# Patient Record
Sex: MALE | Race: WHITE | NOT HISPANIC OR LATINO | Employment: FULL TIME | URBAN - METROPOLITAN AREA
[De-identification: names, ages, dates, MRNs, and addresses within clinical notes are randomized per-mention and may not be internally consistent; named-entity substitution may affect disease eponyms.]

---

## 2017-05-15 ENCOUNTER — HOSPITAL ENCOUNTER (EMERGENCY)
Facility: HOSPITAL | Age: 47
Discharge: HOME | End: 2017-05-15
Attending: EMERGENCY MEDICINE | Admitting: EMERGENCY MEDICINE
Payer: COMMERCIAL

## 2017-05-15 ENCOUNTER — APPOINTMENT (EMERGENCY)
Dept: RADIOLOGY | Facility: HOSPITAL | Age: 47
End: 2017-05-15
Payer: COMMERCIAL

## 2017-05-15 VITALS
SYSTOLIC BLOOD PRESSURE: 153 MMHG | DIASTOLIC BLOOD PRESSURE: 65 MMHG | OXYGEN SATURATION: 100 % | RESPIRATION RATE: 18 BRPM | BODY MASS INDEX: 34.48 KG/M2 | WEIGHT: 246.25 LBS | HEART RATE: 61 BPM | HEIGHT: 71 IN | TEMPERATURE: 98.5 F

## 2017-05-15 DIAGNOSIS — R07.89 CHEST WALL PAIN: Primary | ICD-10-CM

## 2017-05-15 LAB
ALBUMIN SERPL BCP-MCNC: 3.5 G/DL (ref 3.5–5)
ALP SERPL-CCNC: 82 U/L (ref 46–116)
ALT SERPL W P-5'-P-CCNC: 23 U/L (ref 12–78)
ANION GAP SERPL CALCULATED.3IONS-SCNC: 5 MMOL/L (ref 4–13)
AST SERPL W P-5'-P-CCNC: 22 U/L (ref 5–45)
BASOPHILS # BLD AUTO: 0.08 THOUSANDS/ΜL (ref 0–0.1)
BASOPHILS NFR BLD AUTO: 2 % (ref 0–1)
BILIRUB SERPL-MCNC: 0.6 MG/DL (ref 0.2–1)
BUN SERPL-MCNC: 11 MG/DL (ref 5–25)
CALCIUM SERPL-MCNC: 8.5 MG/DL (ref 8.3–10.1)
CHLORIDE SERPL-SCNC: 107 MMOL/L (ref 100–108)
CO2 SERPL-SCNC: 30 MMOL/L (ref 21–32)
CREAT SERPL-MCNC: 1.31 MG/DL (ref 0.6–1.3)
EOSINOPHIL # BLD AUTO: 0.4 THOUSAND/ΜL (ref 0–0.61)
EOSINOPHIL NFR BLD AUTO: 8 % (ref 0–6)
ERYTHROCYTE [DISTWIDTH] IN BLOOD BY AUTOMATED COUNT: 20.9 % (ref 11.6–15.1)
GFR SERPL CREATININE-BSD FRML MDRD: >60 ML/MIN/1.73SQ M
GLUCOSE SERPL-MCNC: 99 MG/DL (ref 65–140)
HCT VFR BLD AUTO: 31.6 % (ref 36.5–49.3)
HGB BLD-MCNC: 9.1 G/DL (ref 12–17)
LYMPHOCYTES # BLD AUTO: 1.49 THOUSANDS/ΜL (ref 0.6–4.47)
LYMPHOCYTES NFR BLD AUTO: 31 % (ref 14–44)
MCH RBC QN AUTO: 18.6 PG (ref 26.8–34.3)
MCHC RBC AUTO-ENTMCNC: 28.8 G/DL (ref 31.4–37.4)
MCV RBC AUTO: 65 FL (ref 82–98)
MONOCYTES # BLD AUTO: 0.6 THOUSAND/ΜL (ref 0.17–1.22)
MONOCYTES NFR BLD AUTO: 12 % (ref 4–12)
NEUTROPHILS # BLD AUTO: 2.31 THOUSANDS/ΜL (ref 1.85–7.62)
NEUTS SEG NFR BLD AUTO: 47 % (ref 43–75)
NRBC BLD AUTO-RTO: 0 /100 WBCS
PLATELET # BLD AUTO: 381 THOUSANDS/UL (ref 149–390)
PMV BLD AUTO: 9.2 FL (ref 8.9–12.7)
POTASSIUM SERPL-SCNC: 3.7 MMOL/L (ref 3.5–5.3)
PROT SERPL-MCNC: 7.7 G/DL (ref 6.4–8.2)
RBC # BLD AUTO: 4.9 MILLION/UL (ref 3.88–5.62)
SODIUM SERPL-SCNC: 142 MMOL/L (ref 136–145)
TROPONIN I SERPL-MCNC: <0.02 NG/ML
WBC # BLD AUTO: 4.89 THOUSAND/UL (ref 4.31–10.16)

## 2017-05-15 PROCEDURE — 93005 ELECTROCARDIOGRAM TRACING: CPT | Performed by: NURSE PRACTITIONER

## 2017-05-15 PROCEDURE — 71020 HB CHEST X-RAY 2VW FRONTAL&LATL: CPT

## 2017-05-15 PROCEDURE — 84484 ASSAY OF TROPONIN QUANT: CPT | Performed by: NURSE PRACTITIONER

## 2017-05-15 PROCEDURE — 85025 COMPLETE CBC W/AUTO DIFF WBC: CPT | Performed by: NURSE PRACTITIONER

## 2017-05-15 PROCEDURE — 99285 EMERGENCY DEPT VISIT HI MDM: CPT

## 2017-05-15 PROCEDURE — 80053 COMPREHEN METABOLIC PANEL: CPT | Performed by: NURSE PRACTITIONER

## 2017-05-15 PROCEDURE — 36415 COLL VENOUS BLD VENIPUNCTURE: CPT | Performed by: NURSE PRACTITIONER

## 2017-05-15 RX ORDER — LORATADINE 10 MG/1
10 TABLET ORAL DAILY
COMMUNITY
End: 2021-01-18

## 2017-05-15 RX ORDER — LEVOTHYROXINE SODIUM 0.12 MG/1
125 TABLET ORAL DAILY
COMMUNITY
End: 2020-05-13 | Stop reason: ALTCHOICE

## 2017-05-16 LAB
ATRIAL RATE: 71 BPM
P AXIS: 60 DEGREES
PR INTERVAL: 160 MS
QRS AXIS: 14 DEGREES
QRSD INTERVAL: 90 MS
QT INTERVAL: 378 MS
QTC INTERVAL: 410 MS
T WAVE AXIS: 40 DEGREES
VENTRICULAR RATE: 71 BPM

## 2020-05-11 ENCOUNTER — TELEPHONE (OUTPATIENT)
Dept: FAMILY MEDICINE CLINIC | Facility: CLINIC | Age: 50
End: 2020-05-11

## 2020-05-11 RX ORDER — TADALAFIL 10 MG/1
TABLET ORAL
COMMUNITY
Start: 2020-05-09 | End: 2020-07-13 | Stop reason: SDUPTHER

## 2020-05-11 RX ORDER — OXYBUTYNIN CHLORIDE 5 MG/1
5 TABLET ORAL 2 TIMES DAILY
COMMUNITY
Start: 2020-03-23

## 2020-05-11 RX ORDER — TAMSULOSIN HYDROCHLORIDE 0.4 MG/1
0.4 CAPSULE ORAL DAILY
COMMUNITY
Start: 2020-02-06

## 2020-05-11 RX ORDER — LEVOTHYROXINE SODIUM 137 UG/1
137 TABLET ORAL DAILY
COMMUNITY
Start: 2020-05-10 | End: 2020-08-13

## 2020-05-13 ENCOUNTER — TELEMEDICINE (OUTPATIENT)
Dept: FAMILY MEDICINE CLINIC | Facility: CLINIC | Age: 50
End: 2020-05-13
Payer: COMMERCIAL

## 2020-05-13 DIAGNOSIS — N40.1 BENIGN PROSTATIC HYPERPLASIA WITH URINARY FREQUENCY: ICD-10-CM

## 2020-05-13 DIAGNOSIS — J30.2 SEASONAL ALLERGIES: ICD-10-CM

## 2020-05-13 DIAGNOSIS — R73.03 PREDIABETES: ICD-10-CM

## 2020-05-13 DIAGNOSIS — E61.1 IRON DEFICIENCY: ICD-10-CM

## 2020-05-13 DIAGNOSIS — E03.9 HYPOTHYROIDISM, UNSPECIFIED TYPE: Primary | ICD-10-CM

## 2020-05-13 DIAGNOSIS — E55.9 VITAMIN D DEFICIENCY: ICD-10-CM

## 2020-05-13 DIAGNOSIS — R35.0 BENIGN PROSTATIC HYPERPLASIA WITH URINARY FREQUENCY: ICD-10-CM

## 2020-05-13 PROCEDURE — 99203 OFFICE O/P NEW LOW 30 MIN: CPT | Performed by: FAMILY MEDICINE

## 2020-05-13 RX ORDER — MULTIVITAMIN
1 CAPSULE ORAL DAILY
COMMUNITY

## 2020-05-13 RX ORDER — CHOLECALCIFEROL (VITAMIN D3) 125 MCG
CAPSULE ORAL
COMMUNITY

## 2020-05-13 RX ORDER — ASCORBIC ACID 500 MG
500 TABLET ORAL DAILY
COMMUNITY

## 2020-06-26 ENCOUNTER — APPOINTMENT (OUTPATIENT)
Dept: LAB | Facility: CLINIC | Age: 50
End: 2020-06-26
Payer: COMMERCIAL

## 2020-06-26 DIAGNOSIS — E55.9 VITAMIN D DEFICIENCY: ICD-10-CM

## 2020-06-26 DIAGNOSIS — R73.03 PREDIABETES: ICD-10-CM

## 2020-06-26 DIAGNOSIS — E61.1 IRON DEFICIENCY: ICD-10-CM

## 2020-06-26 DIAGNOSIS — E03.9 HYPOTHYROIDISM, UNSPECIFIED TYPE: ICD-10-CM

## 2020-06-26 LAB
25(OH)D3 SERPL-MCNC: 46.8 NG/ML (ref 30–100)
ALBUMIN SERPL BCP-MCNC: 3.8 G/DL (ref 3.5–5)
ALP SERPL-CCNC: 73 U/L (ref 46–116)
ALT SERPL W P-5'-P-CCNC: 29 U/L (ref 12–78)
ANION GAP SERPL CALCULATED.3IONS-SCNC: 4 MMOL/L (ref 4–13)
AST SERPL W P-5'-P-CCNC: 22 U/L (ref 5–45)
BASOPHILS # BLD AUTO: 0.07 THOUSANDS/ΜL (ref 0–0.1)
BASOPHILS NFR BLD AUTO: 2 % (ref 0–1)
BILIRUB SERPL-MCNC: 0.93 MG/DL (ref 0.2–1)
BUN SERPL-MCNC: 14 MG/DL (ref 5–25)
CALCIUM SERPL-MCNC: 9.2 MG/DL (ref 8.3–10.1)
CHLORIDE SERPL-SCNC: 105 MMOL/L (ref 100–108)
CHOLEST SERPL-MCNC: 194 MG/DL (ref 50–200)
CO2 SERPL-SCNC: 31 MMOL/L (ref 21–32)
CREAT SERPL-MCNC: 1.28 MG/DL (ref 0.6–1.3)
EOSINOPHIL # BLD AUTO: 0.17 THOUSAND/ΜL (ref 0–0.61)
EOSINOPHIL NFR BLD AUTO: 4 % (ref 0–6)
ERYTHROCYTE [DISTWIDTH] IN BLOOD BY AUTOMATED COUNT: 12.9 % (ref 11.6–15.1)
EST. AVERAGE GLUCOSE BLD GHB EST-MCNC: 123 MG/DL
FERRITIN SERPL-MCNC: 72 NG/ML (ref 8–388)
GFR SERPL CREATININE-BSD FRML MDRD: 65 ML/MIN/1.73SQ M
GLUCOSE P FAST SERPL-MCNC: 93 MG/DL (ref 65–99)
HBA1C MFR BLD: 5.9 %
HCT VFR BLD AUTO: 44.6 % (ref 36.5–49.3)
HDLC SERPL-MCNC: 45 MG/DL
HGB BLD-MCNC: 14.9 G/DL (ref 12–17)
IMM GRANULOCYTES # BLD AUTO: 0.01 THOUSAND/UL (ref 0–0.2)
IMM GRANULOCYTES NFR BLD AUTO: 0 % (ref 0–2)
IRON SATN MFR SERPL: 26 %
IRON SERPL-MCNC: 81 UG/DL (ref 65–175)
LDLC SERPL CALC-MCNC: 141 MG/DL (ref 0–100)
LYMPHOCYTES # BLD AUTO: 1.38 THOUSANDS/ΜL (ref 0.6–4.47)
LYMPHOCYTES NFR BLD AUTO: 36 % (ref 14–44)
MCH RBC QN AUTO: 29.3 PG (ref 26.8–34.3)
MCHC RBC AUTO-ENTMCNC: 33.4 G/DL (ref 31.4–37.4)
MCV RBC AUTO: 88 FL (ref 82–98)
MONOCYTES # BLD AUTO: 0.37 THOUSAND/ΜL (ref 0.17–1.22)
MONOCYTES NFR BLD AUTO: 10 % (ref 4–12)
NEUTROPHILS # BLD AUTO: 1.85 THOUSANDS/ΜL (ref 1.85–7.62)
NEUTS SEG NFR BLD AUTO: 48 % (ref 43–75)
NONHDLC SERPL-MCNC: 149 MG/DL
NRBC BLD AUTO-RTO: 0 /100 WBCS
PLATELET # BLD AUTO: 224 THOUSANDS/UL (ref 149–390)
PMV BLD AUTO: 10.4 FL (ref 8.9–12.7)
POTASSIUM SERPL-SCNC: 4 MMOL/L (ref 3.5–5.3)
PROT SERPL-MCNC: 7.7 G/DL (ref 6.4–8.2)
RBC # BLD AUTO: 5.08 MILLION/UL (ref 3.88–5.62)
SODIUM SERPL-SCNC: 140 MMOL/L (ref 136–145)
TIBC SERPL-MCNC: 309 UG/DL (ref 250–450)
TRIGL SERPL-MCNC: 41 MG/DL
TSH SERPL DL<=0.05 MIU/L-ACNC: 2.85 UIU/ML (ref 0.36–3.74)
VIT B12 SERPL-MCNC: 533 PG/ML (ref 100–900)
WBC # BLD AUTO: 3.85 THOUSAND/UL (ref 4.31–10.16)

## 2020-06-26 PROCEDURE — 80053 COMPREHEN METABOLIC PANEL: CPT

## 2020-06-26 PROCEDURE — 83550 IRON BINDING TEST: CPT

## 2020-06-26 PROCEDURE — 84443 ASSAY THYROID STIM HORMONE: CPT

## 2020-06-26 PROCEDURE — 82306 VITAMIN D 25 HYDROXY: CPT

## 2020-06-26 PROCEDURE — 80061 LIPID PANEL: CPT

## 2020-06-26 PROCEDURE — 36415 COLL VENOUS BLD VENIPUNCTURE: CPT

## 2020-06-26 PROCEDURE — 85025 COMPLETE CBC W/AUTO DIFF WBC: CPT

## 2020-06-26 PROCEDURE — 82607 VITAMIN B-12: CPT

## 2020-06-26 PROCEDURE — 83036 HEMOGLOBIN GLYCOSYLATED A1C: CPT

## 2020-06-26 PROCEDURE — 83540 ASSAY OF IRON: CPT

## 2020-06-26 PROCEDURE — 82728 ASSAY OF FERRITIN: CPT

## 2020-07-13 DIAGNOSIS — N52.9 ERECTILE DYSFUNCTION, UNSPECIFIED ERECTILE DYSFUNCTION TYPE: Primary | ICD-10-CM

## 2020-07-14 DIAGNOSIS — K21.9 GASTROESOPHAGEAL REFLUX DISEASE WITHOUT ESOPHAGITIS: Primary | ICD-10-CM

## 2020-07-14 RX ORDER — PANTOPRAZOLE SODIUM 40 MG/1
40 TABLET, DELAYED RELEASE ORAL DAILY
COMMUNITY
End: 2020-07-14 | Stop reason: SDUPTHER

## 2020-07-14 RX ORDER — PANTOPRAZOLE SODIUM 40 MG/1
40 TABLET, DELAYED RELEASE ORAL DAILY
Qty: 90 TABLET | Refills: 0 | Status: SHIPPED | OUTPATIENT
Start: 2020-07-14

## 2020-07-14 RX ORDER — TADALAFIL 10 MG/1
10 TABLET ORAL DAILY PRN
Qty: 90 TABLET | Refills: 1 | Status: SHIPPED | OUTPATIENT
Start: 2020-07-14

## 2020-08-13 DIAGNOSIS — E03.9 HYPOTHYROIDISM, UNSPECIFIED TYPE: Primary | ICD-10-CM

## 2020-08-13 RX ORDER — LEVOTHYROXINE SODIUM 137 UG/1
TABLET ORAL
Qty: 90 TABLET | Refills: 1 | Status: SHIPPED | OUTPATIENT
Start: 2020-08-13 | End: 2022-07-18 | Stop reason: SDUPTHER

## 2020-12-27 ENCOUNTER — OFFICE VISIT (OUTPATIENT)
Dept: URGENT CARE | Facility: CLINIC | Age: 50
End: 2020-12-27
Payer: COMMERCIAL

## 2020-12-27 VITALS
TEMPERATURE: 97.3 F | HEIGHT: 71 IN | RESPIRATION RATE: 16 BRPM | HEART RATE: 69 BPM | BODY MASS INDEX: 33.32 KG/M2 | SYSTOLIC BLOOD PRESSURE: 135 MMHG | WEIGHT: 238 LBS | DIASTOLIC BLOOD PRESSURE: 66 MMHG | OXYGEN SATURATION: 99 %

## 2020-12-27 DIAGNOSIS — J01.10 ACUTE NON-RECURRENT FRONTAL SINUSITIS: Primary | ICD-10-CM

## 2020-12-27 PROCEDURE — 99203 OFFICE O/P NEW LOW 30 MIN: CPT | Performed by: NURSE PRACTITIONER

## 2020-12-27 RX ORDER — AMOXICILLIN AND CLAVULANATE POTASSIUM 875; 125 MG/1; MG/1
1 TABLET, FILM COATED ORAL EVERY 12 HOURS SCHEDULED
Qty: 14 TABLET | Refills: 0 | Status: SHIPPED | OUTPATIENT
Start: 2020-12-27 | End: 2021-01-03

## 2022-07-18 ENCOUNTER — TELEPHONE (OUTPATIENT)
Dept: FAMILY MEDICINE CLINIC | Facility: CLINIC | Age: 52
End: 2022-07-18

## 2022-07-18 ENCOUNTER — OFFICE VISIT (OUTPATIENT)
Dept: FAMILY MEDICINE CLINIC | Facility: CLINIC | Age: 52
End: 2022-07-18
Payer: COMMERCIAL

## 2022-07-18 VITALS
WEIGHT: 240 LBS | HEIGHT: 71 IN | HEART RATE: 88 BPM | OXYGEN SATURATION: 98 % | BODY MASS INDEX: 33.6 KG/M2 | DIASTOLIC BLOOD PRESSURE: 82 MMHG | SYSTOLIC BLOOD PRESSURE: 130 MMHG

## 2022-07-18 DIAGNOSIS — R00.2 PALPITATIONS: Primary | ICD-10-CM

## 2022-07-18 DIAGNOSIS — R00.1 BRADYCARDIA: ICD-10-CM

## 2022-07-18 DIAGNOSIS — R00.0 TACHYCARDIA: ICD-10-CM

## 2022-07-18 DIAGNOSIS — R00.9 HEART RATE PROBLEM: ICD-10-CM

## 2022-07-18 DIAGNOSIS — I10 ESSENTIAL HYPERTENSION: ICD-10-CM

## 2022-07-18 DIAGNOSIS — E03.9 ACQUIRED HYPOTHYROIDISM: ICD-10-CM

## 2022-07-18 DIAGNOSIS — Z00.00 ENCOUNTER FOR PREVENTIVE CARE: ICD-10-CM

## 2022-07-18 DIAGNOSIS — E03.9 HYPOTHYROIDISM, UNSPECIFIED TYPE: ICD-10-CM

## 2022-07-18 DIAGNOSIS — Z86.2 HISTORY OF IRON DEFICIENCY ANEMIA: ICD-10-CM

## 2022-07-18 DIAGNOSIS — Z11.59 NEED FOR HEPATITIS C SCREENING TEST: ICD-10-CM

## 2022-07-18 DIAGNOSIS — Z13.1 SCREENING FOR DIABETES MELLITUS: ICD-10-CM

## 2022-07-18 DIAGNOSIS — R35.1 NOCTURIA: ICD-10-CM

## 2022-07-18 DIAGNOSIS — K21.9 GASTROESOPHAGEAL REFLUX DISEASE, UNSPECIFIED WHETHER ESOPHAGITIS PRESENT: ICD-10-CM

## 2022-07-18 PROBLEM — R93.1 ABNORMAL ECHOCARDIOGRAM: Status: ACTIVE | Noted: 2021-12-14

## 2022-07-18 PROBLEM — N40.1 BENIGN PROSTATIC HYPERPLASIA WITH URINARY FREQUENCY: Status: RESOLVED | Noted: 2020-05-13 | Resolved: 2022-07-18

## 2022-07-18 PROBLEM — R35.0 BENIGN PROSTATIC HYPERPLASIA WITH URINARY FREQUENCY: Status: RESOLVED | Noted: 2020-05-13 | Resolved: 2022-07-18

## 2022-07-18 PROBLEM — J45.909 ASTHMA: Status: ACTIVE | Noted: 2021-12-14

## 2022-07-18 PROBLEM — E78.2 MIXED HYPERLIPIDEMIA: Status: ACTIVE | Noted: 2021-12-14

## 2022-07-18 PROBLEM — N40.1 BENIGN PROSTATIC HYPERPLASIA WITH NOCTURIA: Status: ACTIVE | Noted: 2021-12-14

## 2022-07-18 PROCEDURE — 99214 OFFICE O/P EST MOD 30 MIN: CPT | Performed by: FAMILY MEDICINE

## 2022-07-18 PROCEDURE — 3079F DIAST BP 80-89 MM HG: CPT | Performed by: FAMILY MEDICINE

## 2022-07-18 PROCEDURE — 3725F SCREEN DEPRESSION PERFORMED: CPT | Performed by: FAMILY MEDICINE

## 2022-07-18 PROCEDURE — 99396 PREV VISIT EST AGE 40-64: CPT | Performed by: FAMILY MEDICINE

## 2022-07-18 RX ORDER — AMOXICILLIN AND CLAVULANATE POTASSIUM 875; 125 MG/1; MG/1
1 TABLET, FILM COATED ORAL EVERY 12 HOURS
COMMUNITY
Start: 2022-04-21 | End: 2022-07-18

## 2022-07-18 RX ORDER — METHYLPREDNISOLONE 4 MG/1
TABLET ORAL
COMMUNITY
Start: 2022-04-21 | End: 2022-07-18

## 2022-07-18 RX ORDER — LEVOTHYROXINE SODIUM 137 UG/1
137 TABLET ORAL DAILY
Qty: 90 TABLET | Refills: 0 | Status: SHIPPED | OUTPATIENT
Start: 2022-07-18 | End: 2022-07-20

## 2022-07-18 RX ORDER — HYDROCHLOROTHIAZIDE 12.5 MG/1
12.5 TABLET ORAL DAILY
COMMUNITY
Start: 2022-01-20 | End: 2022-07-27 | Stop reason: SDUPTHER

## 2022-07-18 RX ORDER — AZITHROMYCIN 250 MG/1
TABLET, FILM COATED ORAL
COMMUNITY
Start: 2022-04-18 | End: 2022-07-18

## 2022-07-18 NOTE — TELEPHONE ENCOUNTER
Patient wanted to make sure a refill of his Levothyroxine was put into [facility] on [facility]  He said Dr Octavio Page had mentioned doing it at today's appt

## 2022-07-18 NOTE — PATIENT INSTRUCTIONS
Please obtain the labs that I have ordered for you today  Attempt to get them fasting, no food or drink except for water for 8-12 hours before  Please continue to monitor her blood pressure at home, make sure that you are seated, no caffeine, and that you rest for at least 2-3 minutes  If you have a wrist cuff please make sure it goes over the chest if it is a arm cuff that make sure your seated and in a resting position  I have ordered for you a Holter monitor and echocardiogram   The cardiologist may end up cancelling these tests, but they will start the process of looking into the fast and slow heart rate and also the function of the heart as well  If you can try to get the results of your stress test that would be greatly beneficial to the cardiologist prior to seeing them  Please watch your caloric intake and attempt to decrease your fatty your foods as well as the foods with no nutritional value  Replace with lean meats and foods that will fill you up

## 2022-07-18 NOTE — PROGRESS NOTES
New Patient Outpatient Note    HPI:     Stephen, 47 y o  male  presents today as a new patient to establish care  The patient is concerned for he recently has been having increased frequency of heart related episodes  He will have pulses that are high and on the low end  The low and has been around 48, the high end can be over 100  Typically the higher values are when he is exercising or walking  The lows occurred when he was laying down or resting, he is not sleeping  He is concerned for he is symptomatic during these times with abnormal feeling all over the body- jitteriness and feeling of uneasiness  He denies any chest pain, shortness of breath, diaphoresis  He does have an appointment with cardiology coming up on 07/27/2022  He does have a significant history of iron deficiency anemia, he was previously on iron but has not taken it in over a year        Family Hx  UTD in chart    Past Medical History:   Diagnosis Date    Allergic rhinitis     Anemia     Asthma 12/14/2021    Disease of thyroid gland     Hypothyroidism     Nasal congestion         Past Surgical History:   Procedure Laterality Date    NO PAST SURGERIES            Current Outpatient Medications:     ascorbic acid (VITAMIN C) 500 mg tablet, Take 500 mg by mouth daily, Disp: , Rfl:     cetirizine (ZyrTEC) 10 mg tablet, Take 1 tablet (10 mg total) by mouth daily at bedtime, Disp: 30 tablet, Rfl: 5    Cholecalciferol (VITAMIN D) 125 MCG (5000 UT) CAPS, Take by mouth, Disp: , Rfl:     Ferrous Sulfate (IRON PO), Take by mouth, Disp: , Rfl:     fluticasone (FLONASE) 50 mcg/act nasal spray, 2 sprays into each nostril 2 (two) times a day, Disp: 1 Bottle, Rfl: 5    hydrochlorothiazide (HYDRODIURIL) 12 5 mg tablet, Take 12 5 mg by mouth daily, Disp: , Rfl:     levothyroxine 137 mcg tablet, Take 1 tablet (137 mcg total) by mouth daily, Disp: 90 tablet, Rfl: 0    Multiple Vitamin (MULTIVITAMIN) capsule, Take 1 capsule by mouth daily, Disp: , Rfl:     oxybutynin (DITROPAN) 5 mg tablet, Take 5 mg by mouth 2 (two) times a day, Disp: , Rfl:     pantoprazole (PROTONIX) 40 mg tablet, Take 1 tablet (40 mg total) by mouth daily, Disp: 90 tablet, Rfl: 0    tadalafil (CIALIS) 10 MG tablet, Take 1 tablet (10 mg total) by mouth daily as needed for erectile dysfunction, Disp: 90 tablet, Rfl: 1    tamsulosin (FLOMAX) 0 4 mg, Take 0 4 mg by mouth daily, Disp: , Rfl:     VENTOLIN  (90 Base) MCG/ACT inhaler, TAKE 2 PUFFS BY MOUTH EVERY 4 TO 6 HOURS AS NEEDED FOR BREATHING, Disp: , Rfl:      SOCIAL:   Rent/Own:  own  Currently living with: Wife, 2 daughters  Stable food: Yes  Safe At Home: Yes  Hobbies:  Work out, outdoors  Profession/ employment: [employer]  Restriction to medical procedures:  none    SEXUAL HISTORY:   Preference:  Women  Sexually Active:  Yes  Birth Control:  None, post menopausal     Psychological History  Psychiatric history: none  History of inpatient:  none  Current Therapy/ Provider:  None  Current Medications:  None    Substance History  Smoking: none  Alcohol Use: 1 drink per week  Substance use:  none      ROS:   Review of Systems   Constitutional: Negative for chills, fever and unexpected weight change  HENT: Positive for postnasal drip  Negative for congestion, ear discharge, ear pain, hearing loss, rhinorrhea, sinus pressure, sinus pain and sore throat  Respiratory: Negative for cough, chest tightness and shortness of breath  Cardiovascular: Positive for palpitations  Negative for chest pain  Gastrointestinal: Negative for abdominal pain, blood in stool, constipation, diarrhea, nausea and vomiting  Endocrine: Negative for polydipsia, polyphagia and polyuria  Genitourinary: Positive for frequency  Negative for dysuria  Skin: Negative for rash and wound  Allergic/Immunologic: Positive for environmental allergies  Neurological: Negative for dizziness, light-headedness and headaches  Psychiatric/Behavioral: Negative for self-injury and suicidal ideas  OBJECTIVE  Vitals:    07/18/22 1338   BP: 130/82   Pulse: 88   SpO2: 98%        Physical Exam  Constitutional:       General: He is not in acute distress  Appearance: Normal appearance  He is obese  He is not ill-appearing, toxic-appearing or diaphoretic  HENT:      Head: Normocephalic and atraumatic  Right Ear: Tympanic membrane, ear canal and external ear normal  There is no impacted cerumen  Left Ear: Tympanic membrane, ear canal and external ear normal  There is no impacted cerumen  Nose: Nose normal  No congestion or rhinorrhea  Mouth/Throat:      Mouth: Mucous membranes are moist       Pharynx: Oropharynx is clear  No oropharyngeal exudate or posterior oropharyngeal erythema  Eyes:      General:         Right eye: No discharge  Left eye: No discharge  Extraocular Movements: Extraocular movements intact  Conjunctiva/sclera: Conjunctivae normal       Pupils: Pupils are equal, round, and reactive to light  Cardiovascular:      Rate and Rhythm: Normal rate and regular rhythm  Pulses: Normal pulses  Heart sounds: Normal heart sounds  No murmur heard  No friction rub  No gallop  Pulmonary:      Effort: Pulmonary effort is normal  No respiratory distress  Breath sounds: Normal breath sounds  No stridor  No wheezing, rhonchi or rales  Abdominal:      General: Bowel sounds are normal  There is no distension  Palpations: Abdomen is soft  Tenderness: There is no abdominal tenderness  Musculoskeletal:         General: Normal range of motion  Cervical back: Neck supple  No tenderness  Lymphadenopathy:      Cervical: No cervical adenopathy  Skin:     General: Skin is warm  Capillary Refill: Capillary refill takes less than 2 seconds  Neurological:      Mental Status: He is alert        Sensory: No sensory deficit ( light touch present in all 4 extremities)  Motor: No weakness (5/5 in all 4 extremities)  Deep Tendon Reflexes: Reflexes normal ( 2/4, intact, C5, C6, L4, S1)  BMI Counseling: Body mass index is 33 47 kg/m²  The BMI is above normal  Nutrition recommendations include decreasing portion sizes, decreasing fast food intake, consuming healthier snacks and increasing intake of lean protein  Exercise recommendations include moderate physical activity 150 minutes/week and exercising 3-5 times per week  Rationale for BMI follow-up plan is due to patient being overweight or obese  Depression Screening and Follow-up Plan: Patient was screened for depression during today's encounter  They screened negative with a PHQ-2 score of 0         ASSESSMENT AND PLAN   Philip Hickey was seen today for medication refill  Diagnoses and all orders for this visit:    Encounter for preventive care  Palpitations  Heart rate problem  Tachycardia  Bradycardia  History of iron deficiency anemia  Screening for diabetes mellitus  Need for hepatitis C screening test  Patient presents with primary concern of abnormal pulses and associated symptoms  Pt  Has appointment for cardiology  Will order labs, echo, and holter monitor  Labs are to look for underlying electrolyte, magnesium, kidney/liver function, or risk factors such as anemia or high cholesterol  TSH to evaluate thyroid and possible abnormal levels, pt  Has hypothyroidism currently on synthroid  CMP to look for underlying diabetes  He has already had a stress test in 2019 that I recommended results be sent to [facility] or brought to appointment  This will be baseline work up prior to follow up with cards, appreciate recommendations  No EKG performed today due to normal rhythm and rate  -     CBC and differential; Future  -     Iron Panel (Includes Ferritin, Iron Sat%, Iron, and TIBC); Future  -     Holter monitor; Future  -     Echo complete w/ contrast if indicated;  Future  -     Magnesium; Future  -     TSH, 3rd generation with Free T4 reflex; Future  -     Comprehensive metabolic panel; Future  -     Lipid panel; Future        -     Hepatitis C antibody; Future    Acquired hypothyroidism  Patient has history of hypothyroidism  Currently on 137mcg  Will obtain follow up labs to review medication management    -     TSH, 3rd generation with Free T4 reflex; Future    Essential Hypertension  Patient currently being treated for elevated Blood pressure  Current regimen includes hydrochlorothiazide 12,5mg daily  Moderately controlled  Patient to take Bps at home to determine whether medication change is necessary or increase in dose  Diuretic is not the first choice due to rapid changes in pulse  May want to consider ACE/ARB or metoprolol  Mild persistent asthma without complication  Stable  Worse in seasonal allergy months  Has albuterol as needed  Patient to call with required refills of medications  GERD  Stable  Continue on prontonix 20mg daily  Avoid trigger foods        Christiana Low DO  [facility]  7/18/2022 6:23 PM

## 2022-07-19 ENCOUNTER — APPOINTMENT (OUTPATIENT)
Dept: LAB | Facility: CLINIC | Age: 52
End: 2022-07-19
Payer: COMMERCIAL

## 2022-07-19 DIAGNOSIS — R00.0 TACHYCARDIA: ICD-10-CM

## 2022-07-19 DIAGNOSIS — Z13.1 SCREENING FOR DIABETES MELLITUS: ICD-10-CM

## 2022-07-19 DIAGNOSIS — Z86.2 HISTORY OF IRON DEFICIENCY ANEMIA: ICD-10-CM

## 2022-07-19 DIAGNOSIS — R00.2 PALPITATIONS: ICD-10-CM

## 2022-07-19 DIAGNOSIS — E03.9 ACQUIRED HYPOTHYROIDISM: ICD-10-CM

## 2022-07-19 DIAGNOSIS — Z00.00 ENCOUNTER FOR PREVENTIVE CARE: ICD-10-CM

## 2022-07-19 DIAGNOSIS — R00.1 BRADYCARDIA: ICD-10-CM

## 2022-07-19 DIAGNOSIS — Z11.59 NEED FOR HEPATITIS C SCREENING TEST: ICD-10-CM

## 2022-07-19 DIAGNOSIS — R00.9 HEART RATE PROBLEM: ICD-10-CM

## 2022-07-19 LAB
ALBUMIN SERPL BCP-MCNC: 4 G/DL (ref 3.5–5)
ALP SERPL-CCNC: 66 U/L (ref 46–116)
ALT SERPL W P-5'-P-CCNC: 29 U/L (ref 12–78)
ANION GAP SERPL CALCULATED.3IONS-SCNC: 6 MMOL/L (ref 4–13)
AST SERPL W P-5'-P-CCNC: 29 U/L (ref 5–45)
BASOPHILS # BLD AUTO: 0.07 THOUSANDS/ΜL (ref 0–0.1)
BASOPHILS NFR BLD AUTO: 2 % (ref 0–1)
BILIRUB SERPL-MCNC: 1.54 MG/DL (ref 0.2–1)
BUN SERPL-MCNC: 9 MG/DL (ref 5–25)
CALCIUM SERPL-MCNC: 9.6 MG/DL (ref 8.3–10.1)
CHLORIDE SERPL-SCNC: 101 MMOL/L (ref 96–108)
CHOLEST SERPL-MCNC: 207 MG/DL
CO2 SERPL-SCNC: 32 MMOL/L (ref 21–32)
CREAT SERPL-MCNC: 1.4 MG/DL (ref 0.6–1.3)
EOSINOPHIL # BLD AUTO: 0.14 THOUSAND/ΜL (ref 0–0.61)
EOSINOPHIL NFR BLD AUTO: 3 % (ref 0–6)
ERYTHROCYTE [DISTWIDTH] IN BLOOD BY AUTOMATED COUNT: 13.2 % (ref 11.6–15.1)
FERRITIN SERPL-MCNC: 128 NG/ML (ref 8–388)
GFR SERPL CREATININE-BSD FRML MDRD: 57 ML/MIN/1.73SQ M
GLUCOSE P FAST SERPL-MCNC: 104 MG/DL (ref 65–99)
HCT VFR BLD AUTO: 47 % (ref 36.5–49.3)
HCV AB SER QL: NORMAL
HDLC SERPL-MCNC: 44 MG/DL
HGB BLD-MCNC: 15.6 G/DL (ref 12–17)
IMM GRANULOCYTES # BLD AUTO: 0.01 THOUSAND/UL (ref 0–0.2)
IMM GRANULOCYTES NFR BLD AUTO: 0 % (ref 0–2)
IRON SATN MFR SERPL: 22 % (ref 20–50)
IRON SERPL-MCNC: 75 UG/DL (ref 65–175)
LDLC SERPL CALC-MCNC: 147 MG/DL (ref 0–100)
LYMPHOCYTES # BLD AUTO: 1.52 THOUSANDS/ΜL (ref 0.6–4.47)
LYMPHOCYTES NFR BLD AUTO: 34 % (ref 14–44)
MAGNESIUM SERPL-MCNC: 1.9 MG/DL (ref 1.6–2.6)
MCH RBC QN AUTO: 29.1 PG (ref 26.8–34.3)
MCHC RBC AUTO-ENTMCNC: 33.2 G/DL (ref 31.4–37.4)
MCV RBC AUTO: 88 FL (ref 82–98)
MONOCYTES # BLD AUTO: 0.47 THOUSAND/ΜL (ref 0.17–1.22)
MONOCYTES NFR BLD AUTO: 11 % (ref 4–12)
NEUTROPHILS # BLD AUTO: 2.28 THOUSANDS/ΜL (ref 1.85–7.62)
NEUTS SEG NFR BLD AUTO: 50 % (ref 43–75)
NONHDLC SERPL-MCNC: 163 MG/DL
NRBC BLD AUTO-RTO: 0 /100 WBCS
PLATELET # BLD AUTO: 243 THOUSANDS/UL (ref 149–390)
PMV BLD AUTO: 10.2 FL (ref 8.9–12.7)
POTASSIUM SERPL-SCNC: 3.7 MMOL/L (ref 3.5–5.3)
PROT SERPL-MCNC: 8.5 G/DL (ref 6.4–8.4)
RBC # BLD AUTO: 5.37 MILLION/UL (ref 3.88–5.62)
SODIUM SERPL-SCNC: 139 MMOL/L (ref 135–147)
T4 FREE SERPL-MCNC: 1.1 NG/DL (ref 0.76–1.46)
TIBC SERPL-MCNC: 335 UG/DL (ref 250–450)
TRIGL SERPL-MCNC: 80 MG/DL
TSH SERPL DL<=0.05 MIU/L-ACNC: 6.04 UIU/ML (ref 0.45–4.5)
WBC # BLD AUTO: 4.49 THOUSAND/UL (ref 4.31–10.16)

## 2022-07-19 PROCEDURE — 84439 ASSAY OF FREE THYROXINE: CPT

## 2022-07-19 PROCEDURE — 86803 HEPATITIS C AB TEST: CPT

## 2022-07-19 PROCEDURE — 36415 COLL VENOUS BLD VENIPUNCTURE: CPT

## 2022-07-19 PROCEDURE — 80053 COMPREHEN METABOLIC PANEL: CPT

## 2022-07-19 PROCEDURE — 83550 IRON BINDING TEST: CPT

## 2022-07-19 PROCEDURE — 83735 ASSAY OF MAGNESIUM: CPT

## 2022-07-19 PROCEDURE — 84443 ASSAY THYROID STIM HORMONE: CPT

## 2022-07-19 PROCEDURE — 80061 LIPID PANEL: CPT

## 2022-07-19 PROCEDURE — 83540 ASSAY OF IRON: CPT

## 2022-07-19 PROCEDURE — 85025 COMPLETE CBC W/AUTO DIFF WBC: CPT

## 2022-07-19 PROCEDURE — 82728 ASSAY OF FERRITIN: CPT

## 2022-07-20 ENCOUNTER — TELEPHONE (OUTPATIENT)
Dept: FAMILY MEDICINE CLINIC | Facility: CLINIC | Age: 52
End: 2022-07-20

## 2022-07-20 DIAGNOSIS — N18.31 STAGE 3A CHRONIC KIDNEY DISEASE (HCC): ICD-10-CM

## 2022-07-20 DIAGNOSIS — R17 ELEVATED BILIRUBIN: ICD-10-CM

## 2022-07-20 DIAGNOSIS — E03.9 HYPOTHYROIDISM, UNSPECIFIED TYPE: Primary | ICD-10-CM

## 2022-07-20 DIAGNOSIS — R79.89 ELEVATED SERUM CREATININE: ICD-10-CM

## 2022-07-20 DIAGNOSIS — E78.2 MIXED HYPERLIPIDEMIA: ICD-10-CM

## 2022-07-20 RX ORDER — LEVOTHYROXINE SODIUM 0.15 MG/1
150 TABLET ORAL DAILY
Qty: 30 TABLET | Refills: 1 | Status: SHIPPED | OUTPATIENT
Start: 2022-07-20 | End: 2022-08-15

## 2022-07-20 NOTE — TELEPHONE ENCOUNTER
Reviewed all labs  Discussed abnormalities and next steps  Due to mild increase in TSH increased dose of levothyroxine to 150mcg  Follow up labs discussed and given reasoning for each       Liborio Ruiz DO  [facility]  7/20/2022 2:32 PM

## 2022-07-27 ENCOUNTER — OFFICE VISIT (OUTPATIENT)
Dept: CARDIOLOGY CLINIC | Facility: CLINIC | Age: 52
End: 2022-07-27
Payer: COMMERCIAL

## 2022-07-27 VITALS
SYSTOLIC BLOOD PRESSURE: 120 MMHG | HEIGHT: 71 IN | BODY MASS INDEX: 32.76 KG/M2 | TEMPERATURE: 98.4 F | HEART RATE: 62 BPM | OXYGEN SATURATION: 96 % | DIASTOLIC BLOOD PRESSURE: 82 MMHG | WEIGHT: 234 LBS

## 2022-07-27 DIAGNOSIS — I10 ESSENTIAL HYPERTENSION: Primary | ICD-10-CM

## 2022-07-27 DIAGNOSIS — R93.1 ABNORMAL ECHOCARDIOGRAM: ICD-10-CM

## 2022-07-27 DIAGNOSIS — E78.2 MIXED HYPERLIPIDEMIA: ICD-10-CM

## 2022-07-27 PROCEDURE — 99203 OFFICE O/P NEW LOW 30 MIN: CPT | Performed by: INTERNAL MEDICINE

## 2022-07-27 PROCEDURE — 93000 ELECTROCARDIOGRAM COMPLETE: CPT | Performed by: INTERNAL MEDICINE

## 2022-07-27 NOTE — PROGRESS NOTES
Consultation - Cardiology Office  [facility]  Igor Ayon 47 y o  male MRN: [id number]  : 1970  Unit/Bed#:  Encounter: [id number]      ASSESSMENT:  Fluctuations in heart rate  Often related to anxiety/panic attack and sometimes postexercise     Heart rate today 62 at rest    History of iron deficiency anemia    Hypertension  BP today is 120/82 mmHg  On Hctz    Hypothyroidism    Obesity, BMI 32 64    Anxiety and Panic Disorders    Nuclear stress test, 2017  Impression:     Normal  exercise nuclear study at an adequate workload >85% of the   predicted maximum heart rate  The images reveal no   evidence for myocardial ischemia or myocardial infarction   The LVEF is  50 %     Lower extremity venous duplex, 2021  RIGHT:  No evidence of DVT or SVT in the right lower extremity                Right anterior mid calf skin thickening with mild   subcutaneous edema noted at patient's area of concern                No evidence of SVT or fluid collection at patient's area of   concern       RECOMMENDATIONS:  Echocardiogram is ordered see 2022  Holter monitor ordered 2022        Thank you for your consultation  If you have any question please call me at [phone]      Primary Care Physician Requesting Consult: Thuan Wagoner DO      Reason for Consult / Principal Problem:  Fluctuations in heart rate and to establish cardiac care        HPI :     Igor Ayon is a 47y o  year old male who was referred by primary care doctor for establishing cardiac care  Patient has a history of hypertension which is well controlled with hydrochlorothiazide  He has also been experiencing fluctuations in his heart rate which goes down when he sleeping or resting and increases with after exercise    Once he saw his heart rate shoot up into the 130s and higher after a possible panic attack  His PCP has already ordered an echocardiogram and a Holter monitor      Review of Systems  REVIEW OF SYSTEMS:      Constitutional: Negative for activity change, appetite change, chills, fatigue, fever and unexpected weight change  HENT: Negative for congestion, sore throat and trouble swallowing  Eyes: Negative for discharge and redness  Respiratory: Negative for apnea, cough, chest tightness, shortness of breath and wheezing  Cardiovascular: Negative for chest pain, shortness of breath, palpitations and leg swelling  Positive for fluctuations in heart rate Gastrointestinal: Negative for abdominal distention, abdominal pain, anal bleeding, blood in stool, constipation, diarrhea, nausea and vomiting  Endocrine: Negative for polydipsia, polyphagia and polyuria  Genitourinary: Negative for difficulty urinating, dysuria, flank pain and hematuria  Musculoskeletal: Negative for arthralgias, myalgias and neck stiffness  Skin: Negative for pallor and rash  Allergic/Immunologic: Negative for environmental allergies  Neurological: Negative for dizziness, syncope, light-headedness, numbness and headaches  Hematological: Negative for adenopathy  Does not bruise/bleed easily  Psychiatric/Behavioral: Negative for confusion and hallucinations   The patient is somewhat anxious and gives a history of panic attack     Historical Information   Past Medical History:   Diagnosis Date    Allergic rhinitis     Anemia     Asthma 12/14/2021    Disease of thyroid gland     Hypothyroidism     Nasal congestion      Past Surgical History:   Procedure Laterality Date    NO PAST SURGERIES       Social History     Substance and Sexual Activity   Alcohol Use Yes    Comment: 1 drink per month     Social History     Substance and Sexual Activity   Drug Use No     Social History     Tobacco Use   Smoking Status Never Smoker   Smokeless Tobacco Never Used     Family History:   Family History   Problem Relation Age of Onset    Breast cancer Mother     Diabetes Father     Coronary artery disease Neg Hx     Colon cancer Neg Hx     Testicular cancer Neg Hx        Meds/Allergies     Allergies   Allergen Reactions    Other Other (See Comments)     Seasonal       Current Outpatient Medications:     ascorbic acid (VITAMIN C) 500 mg tablet, Take 500 mg by mouth daily, Disp: , Rfl:     cetirizine (ZyrTEC) 10 mg tablet, Take 1 tablet (10 mg total) by mouth daily at bedtime, Disp: 30 tablet, Rfl: 5    Cholecalciferol (VITAMIN D) 125 MCG (5000 UT) CAPS, Take by mouth, Disp: , Rfl:     Ferrous Sulfate (IRON PO), Take by mouth, Disp: , Rfl:     fluticasone (FLONASE) 50 mcg/act nasal spray, 2 sprays into each nostril 2 (two) times a day, Disp: 1 Bottle, Rfl: 5    hydrochlorothiazide (HYDRODIURIL) 12 5 mg tablet, Take 12 5 mg by mouth daily, Disp: , Rfl:     levothyroxine (Synthroid) 150 mcg tablet, Take 1 tablet (150 mcg total) by mouth daily, Disp: 30 tablet, Rfl: 1    Multiple Vitamin (MULTIVITAMIN) capsule, Take 1 capsule by mouth daily, Disp: , Rfl:     oxybutynin (DITROPAN) 5 mg tablet, Take 5 mg by mouth 2 (two) times a day, Disp: , Rfl:     pantoprazole (PROTONIX) 40 mg tablet, Take 1 tablet (40 mg total) by mouth daily, Disp: 90 tablet, Rfl: 0    tadalafil (CIALIS) 10 MG tablet, Take 1 tablet (10 mg total) by mouth daily as needed for erectile dysfunction, Disp: 90 tablet, Rfl: 1    tamsulosin (FLOMAX) 0 4 mg, Take 0 4 mg by mouth daily, Disp: , Rfl:     VENTOLIN  (90 Base) MCG/ACT inhaler, TAKE 2 PUFFS BY MOUTH EVERY 4 TO 6 HOURS AS NEEDED FOR BREATHING, Disp: , Rfl:     Vitals: Blood pressure 120/82, pulse 62, temperature 98 4 °F (36 9 °C), height 5' 11" (1 803 m), weight 106 kg (234 lb), SpO2 96 %  ?  Body mass index is 32 64 kg/m²    Vitals:    07/27/22 0802   Weight: 106 kg (234 lb)     BP Readings from Last 3 Encounters:   07/27/22 120/82   07/18/22 130/82   12/27/20 135/66       PHYSICAL EXAMINATION:  Neurologic:  Alert & oriented x 3, no new focal deficits, Not in any acute distress,  Constitutional:  Obese  Eyes:  Pupil equal and reacting to light, conjunctiva normal, No JVP, No LNP   HENT:  Atraumatic, oropharynx moist, Neck- normal range of motion, no tenderness,  Neck supple   Respiratory:  Bilateral air entry, mostly clear to auscultation  Cardiovascular: S1-S2 regular with a I/VI systolic murmur   GI:  Soft, nondistended, normal bowel sounds, nontender, no hepatosplenomegaly appreciated  Musculoskeletal: no tenderness, no deformities  Skin:  Well hydrated, no rash   Lymphatic:  No lymphadenopathy noted   Extremities:  No edema and distal pulses are present    Diagnostic Studies Review Cardio:      EKG:Normal Sinus Rhythm, Heart Rate 62 /minute    Cardiac testing:   No results found for this or any previous visit  Imaging:  Chest X-Ray:   No Chest XR results available for this patient  CT-scan of the chest:     No CTA results available for this patient    Lab Review   Lab Results   Component Value Date    WBC 4 49 07/19/2022    HGB 15 6 07/19/2022    HCT 47 0 07/19/2022    MCV 88 07/19/2022    RDW 13 2 07/19/2022     07/19/2022     BMP:  Lab Results   Component Value Date    SODIUM 139 07/19/2022    K 3 7 07/19/2022     07/19/2022    CO2 32 07/19/2022    BUN 9 07/19/2022    CREATININE 1 40 (H) 07/19/2022    GLUC 99 05/15/2017    GLUF 104 (H) 07/19/2022    CALCIUM 9 6 07/19/2022    EGFR 57 07/19/2022    MG 1 9 07/19/2022     LFT:  Lab Results   Component Value Date    AST 29 07/19/2022    ALT 29 07/19/2022    ALKPHOS 66 07/19/2022    TP 8 5 (H) 07/19/2022    ALB 4 0 07/19/2022      Lab Results   Component Value Date    FHG6CRIBOOGH 6 040 (H) 07/19/2022     No components found for: [employer] [facility]  Lab Results   Component Value Date    HGBA1C 5 9 (H) 06/26/2020     Lipid Profile:   Lab Results   Component Value Date    CHOLESTEROL 207 (H) 07/19/2022    HDL 44 07/19/2022    LDLCALC 147 (H) 07/19/2022    TRIG 80 07/19/2022     Lab Results   Component Value Date CHOLESTEROL 207 (H) 07/19/2022    CHOLESTEROL 194 06/26/2020     Lab Results   Component Value Date    TROPONINI <0 02 05/15/2017     No results found for: NTBNP   Recent Results (from the past 672 hour(s))   TSH, 3rd generation with Free T4 reflex    Collection Time: 07/19/22  8:17 AM   Result Value Ref Range    TSH 3RD GENERATON 6 040 (H) 0 450 - 4 500 uIU/mL   CBC and differential    Collection Time: 07/19/22  8:17 AM   Result Value Ref Range    WBC 4 49 4 31 - 10 16 Thousand/uL    RBC 5 37 3 88 - 5 62 Million/uL    Hemoglobin 15 6 12 0 - 17 0 g/dL    Hematocrit 47 0 36 5 - 49 3 %    MCV 88 82 - 98 fL    MCH 29 1 26 8 - 34 3 pg    MCHC 33 2 31 4 - 37 4 g/dL    RDW 13 2 11 6 - 15 1 %    MPV 10 2 8 9 - 12 7 fL    Platelets 284 048 - 985 Thousands/uL    nRBC 0 /100 WBCs    Neutrophils Relative 50 43 - 75 %    Immat GRANS % 0 0 - 2 %    Lymphocytes Relative 34 14 - 44 %    Monocytes Relative 11 4 - 12 %    Eosinophils Relative 3 0 - 6 %    Basophils Relative 2 (H) 0 - 1 %    Neutrophils Absolute 2 28 1 85 - 7 62 Thousands/µL    Immature Grans Absolute 0 01 0 00 - 0 20 Thousand/uL    Lymphocytes Absolute 1 52 0 60 - 4 47 Thousands/µL    Monocytes Absolute 0 47 0 17 - 1 22 Thousand/µL    Eosinophils Absolute 0 14 0 00 - 0 61 Thousand/µL    Basophils Absolute 0 07 0 00 - 0 10 Thousands/µL   Comprehensive metabolic panel    Collection Time: 07/19/22  8:17 AM   Result Value Ref Range    Sodium 139 135 - 147 mmol/L    Potassium 3 7 3 5 - 5 3 mmol/L    Chloride 101 96 - 108 mmol/L    CO2 32 21 - 32 mmol/L    ANION GAP 6 4 - 13 mmol/L    BUN 9 5 - 25 mg/dL    Creatinine 1 40 (H) 0 60 - 1 30 mg/dL    Glucose, Fasting 104 (H) 65 - 99 mg/dL    Calcium 9 6 8 3 - 10 1 mg/dL    AST 29 5 - 45 U/L    ALT 29 12 - 78 U/L    Alkaline Phosphatase 66 46 - 116 U/L    Total Protein 8 5 (H) 6 4 - 8 4 g/dL    Albumin 4 0 3 5 - 5 0 g/dL    Total Bilirubin 1 54 (H) 0 20 - 1 00 mg/dL    eGFR 57 ml/min/1 73sq m   Lipid panel    Collection Time: 07/19/22  8:17 AM   Result Value Ref Range    Cholesterol 207 (H) See Comment mg/dL    Triglycerides 80 See Comment mg/dL    HDL, Direct 44 >=40 mg/dL    LDL Calculated 147 (H) 0 - 100 mg/dL    Non-HDL-Chol (CHOL-HDL) 163 mg/dl   Magnesium    Collection Time: 07/19/22  8:17 AM   Result Value Ref Range    Magnesium 1 9 1 6 - 2 6 mg/dL   Hepatitis C antibody    Collection Time: 07/19/22  8:17 AM   Result Value Ref Range    Hepatitis C Ab Non-reactive Non-reactive   Iron Saturation %    Collection Time: 07/19/22  8:17 AM   Result Value Ref Range    Iron Saturation 22 20 - 50 %    TIBC 335 250 - 450 ug/dL    Iron 75 65 - 175 ug/dL   Ferritin    Collection Time: 07/19/22  8:17 AM   Result Value Ref Range    Ferritin 128 8 - 388 ng/mL   T4, free    Collection Time: 07/19/22  8:17 AM   Result Value Ref Range    Free T4 1 10 0 76 - 1 46 ng/dL           Dr Sharda Schuster MD, [facility] [place]      "This note has been constructed using a voice recognition system  Therefore there may be syntax, spelling, and/or grammatical errors   Please call if you have any questions  "

## 2022-07-27 NOTE — TELEPHONE ENCOUNTER
Patient will need a refill of this medication to go to  [facility] pharm    hydrochlorothiazide (HYDRODIURIL) 12 5 mg tablet

## 2022-07-28 RX ORDER — HYDROCHLOROTHIAZIDE 12.5 MG/1
12.5 TABLET ORAL DAILY
Qty: 90 TABLET | Refills: 1 | Status: SHIPPED | OUTPATIENT
Start: 2022-07-28 | End: 2023-07-28

## 2022-08-04 ENCOUNTER — HOSPITAL ENCOUNTER (OUTPATIENT)
Dept: NON INVASIVE DIAGNOSTICS | Facility: HOSPITAL | Age: 52
Discharge: HOME | End: 2022-08-04
Payer: COMMERCIAL

## 2022-08-04 VITALS
WEIGHT: 234 LBS | BODY MASS INDEX: 32.76 KG/M2 | SYSTOLIC BLOOD PRESSURE: 138 MMHG | HEIGHT: 71 IN | DIASTOLIC BLOOD PRESSURE: 85 MMHG | HEART RATE: 65 BPM

## 2022-08-04 DIAGNOSIS — R00.9 HEART RATE PROBLEM: ICD-10-CM

## 2022-08-04 DIAGNOSIS — Z86.2 HISTORY OF IRON DEFICIENCY ANEMIA: ICD-10-CM

## 2022-08-04 DIAGNOSIS — R00.0 TACHYCARDIA: ICD-10-CM

## 2022-08-04 DIAGNOSIS — R00.2 PALPITATIONS: ICD-10-CM

## 2022-08-04 DIAGNOSIS — R00.1 BRADYCARDIA: ICD-10-CM

## 2022-08-04 LAB
AORTIC ROOT: 3.5 CM
APICAL FOUR CHAMBER EJECTION FRACTION: 47 %
AV LVOT PEAK GRADIENT: 6 MMHG
AV PEAK GRADIENT: 9 MMHG
DOP CALC LVOT AREA: 3.14 CM2
DOP CALC LVOT DIAMETER: 2 CM
E WAVE DECELERATION TIME: 159 MS
FRACTIONAL SHORTENING: 32 % (ref 28–44)
INTERVENTRICULAR SEPTUM IN DIASTOLE (PARASTERNAL SHORT AXIS VIEW): 1 CM
INTERVENTRICULAR SEPTUM: 1 CM (ref 0.6–1.1)
LAAS-AP2: 22.3 CM2
LAAS-AP4: 17 CM2
LEFT ATRIUM AREA SYSTOLE SINGLE PLANE A4C: 17 CM2
LEFT ATRIUM SIZE: 3.6 CM
LEFT INTERNAL DIMENSION IN SYSTOLE: 3.6 CM (ref 2.1–4)
LEFT VENTRICULAR INTERNAL DIMENSION IN DIASTOLE: 5.3 CM (ref 3.5–6)
LEFT VENTRICULAR POSTERIOR WALL IN END DIASTOLE: 1 CM
LEFT VENTRICULAR STROKE VOLUME: 81 ML
LVSV (TEICH): 81 ML
MV E'TISSUE VEL-LAT: 8 CM/S
MV E'TISSUE VEL-SEP: 8 CM/S
MV PEAK A VEL: 0.73 M/S
MV PEAK E VEL: 82 CM/S
MV STENOSIS PRESSURE HALF TIME: 46 MS
MV VALVE AREA P 1/2 METHOD: 4.78 CM2
PV PEAK GRADIENT: 5 MMHG
RIGHT ATRIUM AREA SYSTOLE A4C: 16.2 CM2
RIGHT VENTRICLE ID DIMENSION: 2.8 CM
SL CV LEFT ATRIUM LENGTH A2C: 5.7 CM
SL CV LV EF: 60
SL CV PED ECHO LEFT VENTRICLE DIASTOLIC VOLUME (MOD BIPLANE) 2D: 136 ML
SL CV PED ECHO LEFT VENTRICLE SYSTOLIC VOLUME (MOD BIPLANE) 2D: 55 ML
TR MAX PG: 24 MMHG
TR PEAK VELOCITY: 2.5 M/S
TRICUSPID VALVE PEAK REGURGITATION VELOCITY: 2.47 M/S

## 2022-08-04 PROCEDURE — 93306 TTE W/DOPPLER COMPLETE: CPT | Performed by: INTERNAL MEDICINE

## 2022-08-04 PROCEDURE — 93226 XTRNL ECG REC<48 HR SCAN A/R: CPT

## 2022-08-04 PROCEDURE — 93225 XTRNL ECG REC<48 HRS REC: CPT

## 2022-08-04 PROCEDURE — 93306 TTE W/DOPPLER COMPLETE: CPT

## 2022-08-08 ENCOUNTER — OFFICE VISIT (OUTPATIENT)
Dept: FAMILY MEDICINE CLINIC | Facility: CLINIC | Age: 52
End: 2022-08-08
Payer: COMMERCIAL

## 2022-08-08 VITALS
WEIGHT: 233 LBS | RESPIRATION RATE: 16 BRPM | OXYGEN SATURATION: 98 % | HEIGHT: 71 IN | BODY MASS INDEX: 32.62 KG/M2 | SYSTOLIC BLOOD PRESSURE: 118 MMHG | HEART RATE: 72 BPM | DIASTOLIC BLOOD PRESSURE: 80 MMHG

## 2022-08-08 DIAGNOSIS — R17 ELEVATED BILIRUBIN: ICD-10-CM

## 2022-08-08 DIAGNOSIS — R00.9 HEART RATE PROBLEM: ICD-10-CM

## 2022-08-08 DIAGNOSIS — N18.31 STAGE 3A CHRONIC KIDNEY DISEASE (HCC): ICD-10-CM

## 2022-08-08 DIAGNOSIS — R00.2 PALPITATIONS: ICD-10-CM

## 2022-08-08 DIAGNOSIS — E03.9 HYPOTHYROIDISM, UNSPECIFIED TYPE: Primary | ICD-10-CM

## 2022-08-08 DIAGNOSIS — R00.1 BRADYCARDIA: ICD-10-CM

## 2022-08-08 DIAGNOSIS — R00.0 TACHYCARDIA: ICD-10-CM

## 2022-08-08 PROCEDURE — 3079F DIAST BP 80-89 MM HG: CPT | Performed by: FAMILY MEDICINE

## 2022-08-08 PROCEDURE — 99213 OFFICE O/P EST LOW 20 MIN: CPT | Performed by: FAMILY MEDICINE

## 2022-08-08 PROCEDURE — 3074F SYST BP LT 130 MM HG: CPT | Performed by: FAMILY MEDICINE

## 2022-08-08 NOTE — PATIENT INSTRUCTIONS
Obtain the labs that we have ordered for you at the end of August this will include a thyroid test, bilirubin test, and to look at your kidney function, electrolytes and liver function  You do not need to be fasting for this  Please obtain labs in 3 months for your cholesterol  We are giving you some time to improve your diet  The cardiologist will likely be calling you after the Holter monitor results are completed  If you do not hear from them in the next several days I will give them a call

## 2022-08-08 NOTE — PROGRESS NOTES
Outpatient Note- Follow up     HPI:     Stephen , 47 y o  male  presents today for follow up of palpitations  The patient has gone through labs which were discussed over the phone no electrolyte or magnesium issues present  Patient did have some abnormal values that were reviewed personally in office today and previously over the phone  In addition to blood work he has completed the ECHO and holter monitor  His Holter is not read yet, the ECHo was grossly normal with trace to mild regurg in two of the valves  Otherwise the structure and function were within normal limits  He was seen by cardiology and they felt that this may just be some anxiety associated with the palpitations  His blood pressure is well maintained on current BP medication regimen  He denies any new or recent chest pain, shortness of breath nausea, vomiting, diarrhea, constipation, palpitations, abdominal pain  Past Medical History:   Diagnosis Date    Allergic rhinitis     Anemia     Asthma 12/14/2021    Disease of thyroid gland     Hypothyroidism     Nasal congestion         ROS:   Review of Systems   See HPI    OBJECTIVE  Vitals:    08/08/22 1321   BP: 118/80   Pulse: 72   Resp: 16   SpO2: 98%      Physical Exam  Constitutional:       General: He is not in acute distress  Appearance: Normal appearance  He is normal weight  He is not ill-appearing, toxic-appearing or diaphoretic  HENT:      Head: Normocephalic and atraumatic  Right Ear: Tympanic membrane, ear canal and external ear normal  There is no impacted cerumen  Left Ear: Tympanic membrane, ear canal and external ear normal  There is no impacted cerumen  Nose: Nose normal  No congestion or rhinorrhea  Mouth/Throat:      Mouth: Mucous membranes are moist       Pharynx: Oropharynx is clear  No oropharyngeal exudate or posterior oropharyngeal erythema  Eyes:      General:         Right eye: No discharge  Left eye: No discharge  Conjunctiva/sclera: Conjunctivae normal       Pupils: Pupils are equal, round, and reactive to light  Cardiovascular:      Rate and Rhythm: Normal rate and regular rhythm  Pulses: Normal pulses  Heart sounds: Normal heart sounds  No murmur heard  No friction rub  No gallop  Pulmonary:      Effort: Pulmonary effort is normal  No respiratory distress  Breath sounds: Normal breath sounds  No stridor  No wheezing, rhonchi or rales  Abdominal:      General: Bowel sounds are normal  There is no distension  Palpations: Abdomen is soft  Tenderness: There is no abdominal tenderness  Musculoskeletal:      Cervical back: Neck supple  No tenderness  Lymphadenopathy:      Cervical: No cervical adenopathy  Neurological:      Mental Status: He is alert  ASSESSMENT AND PLAN   Claudia Alonzo was seen today for follow-up  Diagnoses and all orders for this visit:    Hypothyroidism, unspecified type  Dose of synthroid increased to 150 mcg due to elevated TSH  Patient has started dosing and will follow up with TSH/T4 at the end of august about 6 weeks from prior testing/ change to dose of medication  Stage 3a chronic kidney disease (HCC)  Elevated bilirubin  New onset stage IIIa CKD  Patient has had a decrease in kidney function for multiple years, current estimated GFR is 57  Will follow up with bilirubin (direct and total) to determine if the elevations in these values continue/ require intervention or if surveillance is only needed  Bilirubin increases could be secondary to [place] syndrome but with rule out any hemolytic etiology  Palpitations  Heart rate problem  Tachycardia  Bradycardia  Resolved  Patient likely reassured by imaging and cardiology appointment  I recommend he continue t monito and we will follow up his holter monitor results  Appreciate cardiology recommendations  ECHO and labs reviewed  No significant abnormalities   Will continue to monitor symptoms and with any re-exacerbation of symptoms will need to consider loop recorder or stress test if holter is negative           Kali Schrader DO  [facility]  8/8/2022 6:02 PM n/v n/v

## 2022-08-13 DIAGNOSIS — E03.9 HYPOTHYROIDISM, UNSPECIFIED TYPE: ICD-10-CM

## 2022-08-15 PROCEDURE — 93227 XTRNL ECG REC<48 HR R&I: CPT | Performed by: INTERNAL MEDICINE

## 2022-08-15 RX ORDER — LEVOTHYROXINE SODIUM 0.15 MG/1
TABLET ORAL
Qty: 90 TABLET | Refills: 1 | Status: SHIPPED | OUTPATIENT
Start: 2022-08-15

## 2022-11-08 ENCOUNTER — OFFICE VISIT (OUTPATIENT)
Dept: FAMILY MEDICINE CLINIC | Facility: CLINIC | Age: 52
End: 2022-11-08

## 2022-11-08 VITALS
HEART RATE: 62 BPM | SYSTOLIC BLOOD PRESSURE: 112 MMHG | RESPIRATION RATE: 16 BRPM | DIASTOLIC BLOOD PRESSURE: 78 MMHG | WEIGHT: 235 LBS | HEIGHT: 71 IN | BODY MASS INDEX: 32.9 KG/M2 | OXYGEN SATURATION: 98 %

## 2022-11-08 DIAGNOSIS — I10 ESSENTIAL HYPERTENSION: Primary | ICD-10-CM

## 2022-11-08 DIAGNOSIS — Z23 NEEDS FLU SHOT: ICD-10-CM

## 2022-11-08 DIAGNOSIS — E03.9 HYPOTHYROIDISM, UNSPECIFIED TYPE: ICD-10-CM

## 2022-11-08 DIAGNOSIS — R17 ELEVATED BILIRUBIN: ICD-10-CM

## 2022-11-08 DIAGNOSIS — E78.2 MIXED HYPERLIPIDEMIA: ICD-10-CM

## 2022-11-08 DIAGNOSIS — R00.9 HEART RATE PROBLEM: ICD-10-CM

## 2022-11-08 DIAGNOSIS — N18.31 STAGE 3A CHRONIC KIDNEY DISEASE (HCC): ICD-10-CM

## 2022-11-08 DIAGNOSIS — Z79.899 MEDICATION MANAGEMENT: ICD-10-CM

## 2022-11-08 DIAGNOSIS — R79.89 ELEVATED SERUM CREATININE: ICD-10-CM

## 2022-11-08 DIAGNOSIS — R00.2 PALPITATIONS: ICD-10-CM

## 2022-11-08 DIAGNOSIS — R00.0 TACHYCARDIA: ICD-10-CM

## 2022-11-08 RX ORDER — HYDROCHLOROTHIAZIDE 12.5 MG/1
12.5 TABLET ORAL DAILY
Qty: 90 TABLET | Refills: 1 | Status: SHIPPED | OUTPATIENT
Start: 2022-11-08 | End: 2023-11-08

## 2022-11-08 NOTE — PATIENT INSTRUCTIONS
Continue to take your medications as prescribed  We will follow up with any need for changes or additions after lab work has been obtained  Please obtain the labs that we have ordered for you fasting, no food or drink except for water for 8-12 hours before  I know that you have obtain labs in the past, please make sure you are going to lab that is covered by insurance  I have refilled her hydrochlorothiazide, once your thyroid testing is back we will send over a refill of the thyroid medication based on its numbers      Make sure you continue to monitor your heart, if there is any concerns for increased heart rate, palpitations, or chest pain please make sure you follow-up in the office or the emergency room depending on the severity

## 2022-11-08 NOTE — PROGRESS NOTES
Outpatient Note- Follow up     HPI:     Stephen , 47 y o  male  presents today for follow up of chronic conditions  The patient has not had any recent heart or chest related issues  He has a history of palpitations and chest pain that was worked up by ECHO and holter monitor which were grossly normal   The patient also saw cardiology which diagnosed the palpitations and tachycardia associated with anxiety  He has not had any exacerbations or new symptoms associated  He is due for follow up of multiple labs including his bilirubin, lipids, and kidney function  He had stage IIIA chornic kidney disease with a eGFR of 54  His ASCVD score is still below 7 5% and therefore we will be tracking this value with follow up lipid/cholesterol labs as well  Lastly we increased his synthroid the last visit about three months ago, this requires follow up labs to determine if the thyroid is in the proper ranges  He denies any significant changes to mood, energy, hair or nails  He denies fever, chills, nausea, vomiting, diarrhea, constipation, chest pain, shortness of breath  Past Medical History:   Diagnosis Date   • Allergic rhinitis    • Anemia    • Asthma 12/14/2021   • Disease of thyroid gland    • Hypothyroidism    • Nasal congestion         ROS:   Review of Systems   See HPI    OBJECTIVE  Vitals:    11/08/22 0807   BP: 112/78   Pulse: 62   Resp: 16   SpO2: 98%        Physical Exam  Constitutional:       General: He is not in acute distress  Appearance: Normal appearance  He is obese  He is not ill-appearing, toxic-appearing or diaphoretic  HENT:      Head: Normocephalic  Right Ear: Tympanic membrane, ear canal and external ear normal  There is no impacted cerumen  Left Ear: Tympanic membrane, ear canal and external ear normal  There is no impacted cerumen  Nose: Nose normal  No congestion or rhinorrhea        Mouth/Throat:      Mouth: Mucous membranes are moist       Pharynx: Oropharynx is clear  No oropharyngeal exudate or posterior oropharyngeal erythema  Eyes:      General:         Right eye: No discharge  Left eye: No discharge  Pupils: Pupils are equal, round, and reactive to light  Cardiovascular:      Rate and Rhythm: Normal rate and regular rhythm  Pulses: Normal pulses  Heart sounds: Normal heart sounds  No murmur heard  No friction rub  No gallop  Pulmonary:      Effort: Pulmonary effort is normal  No respiratory distress  Breath sounds: Normal breath sounds  No stridor  No wheezing, rhonchi or rales  Abdominal:      General: Bowel sounds are normal  There is no distension  Palpations: Abdomen is soft  Tenderness: There is no abdominal tenderness  Musculoskeletal:      Cervical back: Neck supple  No tenderness  Lymphadenopathy:      Cervical: No cervical adenopathy  Neurological:      Mental Status: He is alert  ASSESSMENT AND PLAN   Killian Pickens was seen today for follow-up  Diagnoses and all orders for this visit:    Essential hypertension  Stable  Well controlled with 12 5mg Hydrochlorothiazide  Continue current regimen  -     hydrochlorothiazide (HYDRODIURIL) 12 5 mg tablet; Take 1 tablet (12 5 mg total) by mouth daily    Stage 3a chronic kidney disease (HCC)  Elevated serum creatinine  Patient to follow up elevated creatinine and kidney function since he qualified for stage IIIA with a GFR of 54  Will follow up and review medication if still decreased  -     Comprehensive metabolic panel; Future    Elevated bilirubin  Will follow up elevated bilirubin with a direct bili to determine the possible etiologies  Possibly [place] syndrome but bili is higher than expected  If still unknown etiology further work up with hepatitis panel and imaging may be necessary    -     Bilirubin, direct; Future  -     Comprehensive metabolic panel;  Future    Hypothyroidism, unspecified type  Recent change to medication around time of last visit  Increase to 150mcg needs to be evaluated and will need to send refill once dosage confirmed with labs  -     TSH, 3rd generation with Free T4 reflex; Future    Palpitations  Heart rate problem  Tachycardia  Resolved  Likely due to anxiety  Full work up performed with holter, echo, and cardiology consult  Mixed hyperlipidemia  Medication management  Hyperlipidemia being monitored with labs  His ASCVD risk is beow 7 5%  Will follow up with labs to determine if medication is necessary    -     Lipid panel; Future  -     Comprehensive metabolic panel; Future    Needs flu shot  Need for flu shot    -     influenza vaccine, quadrivalent, recombinant, PF, 0 5 mL, for patients 18 yr+ (FLUBLOK)             Lc Pierson DO  [facility]  11/8/2022 12:21 PM

## 2022-11-14 ENCOUNTER — APPOINTMENT (OUTPATIENT)
Dept: LAB | Facility: CLINIC | Age: 52
End: 2022-11-14

## 2022-11-14 DIAGNOSIS — E03.9 HYPOTHYROIDISM, UNSPECIFIED TYPE: ICD-10-CM

## 2022-11-14 DIAGNOSIS — R79.89 ELEVATED SERUM CREATININE: ICD-10-CM

## 2022-11-14 DIAGNOSIS — R17 ELEVATED BILIRUBIN: ICD-10-CM

## 2022-11-14 DIAGNOSIS — Z79.899 MEDICATION MANAGEMENT: ICD-10-CM

## 2022-11-14 DIAGNOSIS — N18.31 STAGE 3A CHRONIC KIDNEY DISEASE (HCC): ICD-10-CM

## 2022-11-14 DIAGNOSIS — E78.2 MIXED HYPERLIPIDEMIA: ICD-10-CM

## 2022-11-14 LAB
ALBUMIN SERPL BCP-MCNC: 3.4 G/DL (ref 3.5–5)
ALP SERPL-CCNC: 78 U/L (ref 46–116)
ALT SERPL W P-5'-P-CCNC: 25 U/L (ref 12–78)
ANION GAP SERPL CALCULATED.3IONS-SCNC: 7 MMOL/L (ref 4–13)
AST SERPL W P-5'-P-CCNC: 21 U/L (ref 5–45)
BILIRUB DIRECT SERPL-MCNC: 0.26 MG/DL (ref 0–0.2)
BILIRUB SERPL-MCNC: 1.32 MG/DL (ref 0.2–1)
BUN SERPL-MCNC: 11 MG/DL (ref 5–25)
CALCIUM ALBUM COR SERPL-MCNC: 9.7 MG/DL (ref 8.3–10.1)
CALCIUM SERPL-MCNC: 9.2 MG/DL (ref 8.3–10.1)
CHLORIDE SERPL-SCNC: 104 MMOL/L (ref 96–108)
CHOLEST SERPL-MCNC: 161 MG/DL
CO2 SERPL-SCNC: 28 MMOL/L (ref 21–32)
CREAT SERPL-MCNC: 1.32 MG/DL (ref 0.6–1.3)
GFR SERPL CREATININE-BSD FRML MDRD: 62 ML/MIN/1.73SQ M
GLUCOSE P FAST SERPL-MCNC: 109 MG/DL (ref 65–99)
HDLC SERPL-MCNC: 39 MG/DL
LDLC SERPL CALC-MCNC: 105 MG/DL (ref 0–100)
NONHDLC SERPL-MCNC: 122 MG/DL
POTASSIUM SERPL-SCNC: 3.6 MMOL/L (ref 3.5–5.3)
PROT SERPL-MCNC: 7.9 G/DL (ref 6.4–8.4)
SODIUM SERPL-SCNC: 139 MMOL/L (ref 135–147)
T4 FREE SERPL-MCNC: 1.22 NG/DL (ref 0.76–1.46)
TRIGL SERPL-MCNC: 87 MG/DL
TSH SERPL DL<=0.05 MIU/L-ACNC: 0.03 UIU/ML (ref 0.45–4.5)

## 2022-11-22 ENCOUNTER — TELEPHONE (OUTPATIENT)
Dept: FAMILY MEDICINE CLINIC | Facility: CLINIC | Age: 52
End: 2022-11-22

## 2022-11-22 DIAGNOSIS — E03.9 HYPOTHYROIDISM, UNSPECIFIED TYPE: Primary | ICD-10-CM

## 2022-11-22 RX ORDER — LEVOTHYROXINE SODIUM 137 UG/1
137 TABLET ORAL DAILY
Qty: 90 TABLET | Refills: 0 | Status: SHIPPED | OUTPATIENT
Start: 2022-11-22

## 2022-11-22 NOTE — TELEPHONE ENCOUNTER
Patient called and stated he is due for a refill of his levothyroxine, however he stated he was told to check to see if his dosage would need to change based on the labs done on 11/14

## 2022-11-23 ENCOUNTER — TELEPHONE (OUTPATIENT)
Dept: FAMILY MEDICINE CLINIC | Facility: CLINIC | Age: 52
End: 2022-11-23

## 2022-11-23 DIAGNOSIS — R17 ELEVATED BILIRUBIN: ICD-10-CM

## 2022-11-23 DIAGNOSIS — Z79.899 MEDICATION MANAGEMENT: ICD-10-CM

## 2022-11-23 DIAGNOSIS — E03.9 HYPOTHYROIDISM, UNSPECIFIED TYPE: Primary | ICD-10-CM

## 2022-11-23 DIAGNOSIS — N18.31 STAGE 3A CHRONIC KIDNEY DISEASE (HCC): ICD-10-CM

## 2022-11-23 NOTE — TELEPHONE ENCOUNTER
Called and reviewed labs  The patient saw my message and also will continue with his current diet  We will be decreasing thyroid med back to 137mcg  Repeat that in 3 months

## 2023-02-07 ENCOUNTER — OFFICE VISIT (OUTPATIENT)
Dept: CARDIOLOGY CLINIC | Facility: CLINIC | Age: 53
End: 2023-02-07

## 2023-02-07 VITALS
SYSTOLIC BLOOD PRESSURE: 120 MMHG | DIASTOLIC BLOOD PRESSURE: 72 MMHG | HEART RATE: 57 BPM | BODY MASS INDEX: 34.16 KG/M2 | HEIGHT: 71 IN | OXYGEN SATURATION: 99 % | WEIGHT: 244 LBS

## 2023-02-07 DIAGNOSIS — R93.1 ABNORMAL ECHOCARDIOGRAM: ICD-10-CM

## 2023-02-07 DIAGNOSIS — I10 ESSENTIAL HYPERTENSION: Primary | ICD-10-CM

## 2023-02-07 DIAGNOSIS — E78.2 MIXED HYPERLIPIDEMIA: ICD-10-CM

## 2023-02-07 NOTE — PROGRESS NOTES
Progress Note - Cardiology Office  [facility]    Yael Chapin 47 y o  male MRN: [id number]  : 1970  Encounter: [id number]    ASSESSMENT:  Fluctuations in heart rate  Often related to anxiety/panic attack and sometimes postexercise     Heart rate today 62 at rest    24-hour Holter monitor, 2022:  Sinus rhythm with rare PACs and PVCs  No major symptoms reported    TTE, 2022:  EF 60%, trace MR, trace to mild TR     History of iron deficiency anemia     Hypertension  BP today is 120/72 mmHg with heart rate of 57/min  On Hctz     Hypothyroidism     Obesity, BMI 34 03     History of anxiety and Panic Disorders     Nuclear stress test, 2017  Impression:     Normal  exercise nuclear study at an adequate workload >85% of the   predicted maximum heart rate  The images reveal no   evidence for myocardial ischemia or myocardial infarction   The LVEF is  50 %      Lower extremity venous duplex, 2021  RIGHT:  No evidence of DVT or SVT in the right lower extremity                Right anterior mid calf skin thickening with mild   subcutaneous edema noted at patient's area of concern                No evidence of SVT or fluid collection at patient's area of   concern         RECOMMENDATIONS:  Low-salt diet  Regular cardiovascular exercise and weight loss      Please call [phone] if any questions  HPI :     Yael Chapin is a 47y o  year old male who came for follow up  He generally feels well and has no symptoms  We discussed the result of his stress test, lower extremity venous duplex and Holter monitor    Patient has gained weight and I advised him on regular cardiovascular exercise, low calorie diet and weight loss    REVIEW OF SYSTEMS:  Denies any chest pain, dyspnea, palpitations or syncope    Historical Information   Past Medical History:   Diagnosis Date   • Allergic rhinitis    • Anemia    • Asthma 2021   • Disease of thyroid gland    • Hypothyroidism    • Nasal congestion      Past Surgical History:   Procedure Laterality Date   • NO PAST SURGERIES       Social History     Substance and Sexual Activity   Alcohol Use Yes    Comment: 1 drink per month     Social History     Substance and Sexual Activity   Drug Use No     Social History     Tobacco Use   Smoking Status Never   Smokeless Tobacco Never     Family History:   Family History   Problem Relation Age of Onset   • Breast cancer Mother    • Diabetes Father    • Coronary artery disease Neg Hx    • Colon cancer Neg Hx    • Testicular cancer Neg Hx        Meds/Allergies     Allergies   Allergen Reactions   • Other Other (See Comments)     Seasonal       Current Outpatient Medications:   •  ascorbic acid (VITAMIN C) 500 mg tablet, Take 500 mg by mouth daily, Disp: , Rfl:   •  cetirizine (ZyrTEC) 10 mg tablet, Take 1 tablet (10 mg total) by mouth daily at bedtime, Disp: 30 tablet, Rfl: 5  •  Cholecalciferol (VITAMIN D) 125 MCG (5000 UT) CAPS, Take by mouth, Disp: , Rfl:   •  Ferrous Sulfate (IRON PO), Take by mouth, Disp: , Rfl:   •  fluticasone (FLONASE) 50 mcg/act nasal spray, 2 sprays into each nostril 2 (two) times a day, Disp: 1 Bottle, Rfl: 5  •  hydrochlorothiazide (HYDRODIURIL) 12 5 mg tablet, Take 1 tablet (12 5 mg total) by mouth daily, Disp: 90 tablet, Rfl: 1  •  levothyroxine (Synthroid) 137 mcg tablet, Take 1 tablet (137 mcg total) by mouth daily, Disp: 90 tablet, Rfl: 0  •  Multiple Vitamin (MULTIVITAMIN) capsule, Take 1 capsule by mouth daily, Disp: , Rfl:   •  oxybutynin (DITROPAN) 5 mg tablet, Take 5 mg by mouth daily at bedtime, Disp: , Rfl:   •  tadalafil (CIALIS) 10 MG tablet, Take 1 tablet (10 mg total) by mouth daily as needed for erectile dysfunction, Disp: 90 tablet, Rfl: 1  •  tamsulosin (FLOMAX) 0 4 mg, Take 0 4 mg by mouth daily, Disp: , Rfl:   •  VENTOLIN  (90 Base) MCG/ACT inhaler, TAKE 2 PUFFS BY MOUTH EVERY 4 TO 6 HOURS AS NEEDED FOR BREATHING, Disp: , Rfl:     Vitals: Blood pressure 120/72, pulse 57, height 5' 11" (1 803 m), weight 111 kg (244 lb), SpO2 99 %  ?  Body mass index is 34 03 kg/m²  Vitals:    23 0843   Weight: 111 kg (244 lb)     BP Readings from Last 3 Encounters:   23 120/72   22 112/78   22 118/80       Physical Exam:    Neurologic:  Alert & oriented x 3, no new focal deficits, Not in any acute distress,  Constitutional: Obese  Eyes:  Pupil equal and reacting to light, conjunctiva normal,   HENT:  Atraumatic, oropharynx moist, Neck- normal range of motion, no tenderness,  Neck supple, No JVP, No LNP   Respiratory:  Bilateral air entry, mostly clear to auscultation  Cardiovascular: S1-S2 regular rhythm  GI:  Soft, nondistended, normal bowel sounds, nontender, no hepatosplenomegaly appreciated  Musculoskeletal:  No tenderness, no deformities  Skin:  Well hydrated, no rash   Lymphatic:  No lymphadenopathy noted   Extremities:  No edema and distal pulses are present        Diagnostic Studies Review Cardio:      EKG: Sinus bradycardia, heart rate 57/min    Cardiac testing:       Results for orders placed during the hospital encounter of 22    Echo complete w/ contrast if indicated    Interpretation Summary  •  Left Ventricle: Left ventricular cavity size is normal  Wall thickness is normal  The left ventricular ejection fraction is 60% by visual estimation  Systolic function is normal  Wall motion is normal  Diastolic function is normal for age  •  Mitral Valve: There is trace regurgitation  •  Tricuspid Valve: There is trace to mild regurgitation  Pulmonary artery pressure around 30 mmHg  Results for orders placed during the hospital encounter of 22    Holter monitor    Interpretation Summary  PT NAME: Ronald Hood  : 1970  AGE: 47 y o    GENDER: male  MRN: [id number]  PROCEDURE: Holter monitor - 24 hour    DATE OF PROCEDURE:  2022    INDICATIONS:  24 hour Holter monitor was performed for palpitations    PROCEDURE:    Average heart rate was 71; minimum heart rate was sinus bradycardia at 46 BPM at 4:16 a m ; and maximum heart rate was sinus tachycardia at 146 BPM at 11:32 a m  Naduglas Copeland Ventricular ectopic activity consisted of 188, which comprises 0 2% of total beats  156 were single PVCs    Supraventricular ectopic activity consisted of 25, which comprises 0% of total beats  18 were single PACs    No significant bradyarrhythmias were present  No evidence of atrial fibrillation or atrial flutter    Patient's diary included symptoms of no major symptoms reported in journal entry  When the patient was on his treadmill he was in sinus rhythm with a controlled rhythm    Impression  1  Normal 48 hour holter tracing report  2  Rhythm was sinus throughout monitoring period  3  There were rare episodes of Supraventricular ectopic activity  4  There were rare episodes of Ventricular ectopic activity  Interpreted by: Patricia Brannon MD, [address]      Imaging:  Chest X-Ray:   No Chest XR results available for this patient  CT-scan of the chest:     No CTA results available for this patient    Lab Review   Lab Results   Component Value Date    WBC 4 49 07/19/2022    HGB 15 6 07/19/2022    HCT 47 0 07/19/2022    MCV 88 07/19/2022    RDW 13 2 07/19/2022     07/19/2022     BMP:  Lab Results   Component Value Date    SODIUM 139 11/14/2022    K 3 6 11/14/2022     11/14/2022    CO2 28 11/14/2022    BUN 11 11/14/2022    CREATININE 1 32 (H) 11/14/2022    GLUC 99 05/15/2017    GLUF 109 (H) 11/14/2022    CALCIUM 9 2 11/14/2022    CORRECTEDCA 9 7 11/14/2022    EGFR 62 11/14/2022    MG 1 9 07/19/2022     LFT:  Lab Results   Component Value Date    AST 21 11/14/2022    ALT 25 11/14/2022    ALKPHOS 78 11/14/2022    TP 7 9 11/14/2022    ALB 3 4 (L) 11/14/2022      No components found for: [employer] [facility]  Lab Results   Component Value Date    TJL9MLXGZYJP 0 029 (L) 11/14/2022     Lab Results   Component Value Date HGBA1C 5 9 (H) 06/26/2020     Lipid Profile:   Lab Results   Component Value Date    CHOLESTEROL 161 11/14/2022    HDL 39 (L) 11/14/2022    LDLCALC 105 (H) 11/14/2022    TRIG 87 11/14/2022     Lab Results   Component Value Date    CHOLESTEROL 161 11/14/2022    CHOLESTEROL 207 (H) 07/19/2022     Lab Results   Component Value Date    TROPONINI <0 02 05/15/2017     No results found for: NTBNP   No results found for this or any previous visit (from the past 672 hour(s))  Dr Jana Willis MD, [facility]      "This note has been constructed using a voice recognition system  Therefore there may be syntax, spelling, and/or grammatical errors   Please call if you have any questions  "

## 2023-03-02 DIAGNOSIS — R73.01 ELEVATED FASTING GLUCOSE: Primary | ICD-10-CM

## 2023-03-02 DIAGNOSIS — E03.9 HYPOTHYROIDISM, UNSPECIFIED TYPE: ICD-10-CM

## 2023-03-02 DIAGNOSIS — R17 ELEVATED BILIRUBIN: ICD-10-CM

## 2023-03-02 RX ORDER — LEVOTHYROXINE SODIUM 137 UG/1
TABLET ORAL
Qty: 30 TABLET | Refills: 0 | Status: SHIPPED | OUTPATIENT
Start: 2023-03-02

## 2023-03-02 NOTE — TELEPHONE ENCOUNTER
Patient is due for follow-up labs, according to last phone message he should have followed up with labs in 3 months  There was a prior order placed, but I placed new orders in case they were closed for any reason  I have given the patient 30 days worth of the medication, but he will need to follow-up with labs prior to May  Please call the patient and inform him of the requested labs

## 2023-03-02 NOTE — TELEPHONE ENCOUNTER
Patient stopped in the office for a refill  His dosage was changed in November I do not see a lab order in his chart so not sure if you want to order labs ? Should they be done for May when patient is due for a follow up or now? Please advise

## 2023-04-04 DIAGNOSIS — R35.0 URINARY FREQUENCY: Primary | ICD-10-CM

## 2023-04-04 DIAGNOSIS — R35.1 NOCTURIA: ICD-10-CM

## 2023-04-05 RX ORDER — OXYBUTYNIN CHLORIDE 5 MG/1
5 TABLET ORAL
Qty: 45 TABLET | Refills: 0 | Status: SHIPPED | OUTPATIENT
Start: 2023-04-05

## 2023-04-05 RX ORDER — OXYBUTYNIN CHLORIDE 5 MG/1
5 TABLET ORAL
OUTPATIENT
Start: 2023-04-05

## 2023-04-05 NOTE — TELEPHONE ENCOUNTER
Patient had been on the medication but had not taken   Had some left and started retaking it due to night waking to urinate   He does have a follow up with you on 05/09/2023

## 2023-04-05 NOTE — TELEPHONE ENCOUNTER
Please call patient and ask why he is currently on this medicaiton and if he remains on it  It was last prescribed in our system in 2020  If he wants to get back on the medication after being off it, then he will need to come in for an appointment         Froy Borja DO  [facility]  4/5/2023 7:21 AM

## 2023-04-06 NOTE — TELEPHONE ENCOUNTER
Order placed for 45 days  This is to hold the patient over until it can be further discussed in office  He took extra medication and was tolerating per nursing       Halle Diaz DO  [facility]  4/5/2023 8:41 PM

## 2023-05-05 LAB
ALBUMIN SERPL-MCNC: 4.4 G/DL (ref 3.6–5.1)
ALBUMIN/GLOB SERPL: 1.4 (CALC) (ref 1–2.5)
ALP SERPL-CCNC: 72 U/L (ref 35–144)
ALT SERPL-CCNC: 18 U/L (ref 9–46)
AST SERPL-CCNC: 19 U/L (ref 10–35)
BILIRUB DIRECT SERPL-MCNC: 0.2 MG/DL
BILIRUB SERPL-MCNC: 1.1 MG/DL (ref 0.2–1.2)
BUN SERPL-MCNC: 16 MG/DL (ref 7–25)
BUN/CREAT SERPL: 12 (CALC) (ref 6–22)
CALCIUM SERPL-MCNC: 9.3 MG/DL (ref 8.6–10.3)
CHLORIDE SERPL-SCNC: 98 MMOL/L (ref 98–110)
CO2 SERPL-SCNC: 32 MMOL/L (ref 20–32)
CREAT SERPL-MCNC: 1.34 MG/DL (ref 0.7–1.3)
EST. AVERAGE GLUCOSE BLD GHB EST-MCNC: 123 MG/DL
EST. AVERAGE GLUCOSE BLD GHB EST-SCNC: 6.8 MMOL/L
GFR/BSA.PRED SERPLBLD CYS-BASED-ARV: 64 ML/MIN/1.73M2
GLOBULIN SER CALC-MCNC: 3.2 G/DL (CALC) (ref 1.9–3.7)
GLUCOSE SERPL-MCNC: 92 MG/DL (ref 65–99)
HBA1C MFR BLD: 5.9 % OF TOTAL HGB
POTASSIUM SERPL-SCNC: 3.7 MMOL/L (ref 3.5–5.3)
PROT SERPL-MCNC: 7.6 G/DL (ref 6.1–8.1)
SODIUM SERPL-SCNC: 139 MMOL/L (ref 135–146)
T4 FREE SERPL-MCNC: 1.3 NG/DL (ref 0.8–1.8)
TSH SERPL-ACNC: 0.13 MIU/L (ref 0.4–4.5)

## 2023-05-09 ENCOUNTER — OFFICE VISIT (OUTPATIENT)
Dept: FAMILY MEDICINE CLINIC | Facility: CLINIC | Age: 53
End: 2023-05-09

## 2023-05-09 VITALS
HEART RATE: 69 BPM | DIASTOLIC BLOOD PRESSURE: 70 MMHG | RESPIRATION RATE: 16 BRPM | WEIGHT: 244 LBS | HEIGHT: 71 IN | BODY MASS INDEX: 34.16 KG/M2 | SYSTOLIC BLOOD PRESSURE: 114 MMHG | OXYGEN SATURATION: 97 %

## 2023-05-09 DIAGNOSIS — R35.1 NOCTURIA: ICD-10-CM

## 2023-05-09 DIAGNOSIS — E03.9 ACQUIRED HYPOTHYROIDISM: ICD-10-CM

## 2023-05-09 DIAGNOSIS — R35.0 URINARY FREQUENCY: ICD-10-CM

## 2023-05-09 DIAGNOSIS — J45.20 MILD INTERMITTENT ASTHMA WITHOUT COMPLICATION: ICD-10-CM

## 2023-05-09 DIAGNOSIS — N52.9 VASCULOGENIC ERECTILE DYSFUNCTION, UNSPECIFIED VASCULOGENIC ERECTILE DYSFUNCTION TYPE: ICD-10-CM

## 2023-05-09 DIAGNOSIS — J30.2 SEASONAL ALLERGIES: Primary | ICD-10-CM

## 2023-05-09 DIAGNOSIS — R09.81 NASAL CONGESTION: ICD-10-CM

## 2023-05-09 DIAGNOSIS — J34.89 RHINORRHEA: ICD-10-CM

## 2023-05-09 RX ORDER — ALBUTEROL SULFATE 90 UG/1
2 AEROSOL, METERED RESPIRATORY (INHALATION) EVERY 6 HOURS PRN
Qty: 18 G | Refills: 0 | Status: SHIPPED | OUTPATIENT
Start: 2023-05-09

## 2023-05-09 RX ORDER — AZELASTINE 1 MG/ML
1 SPRAY, METERED NASAL 2 TIMES DAILY
Qty: 30 ML | Refills: 0 | Status: SHIPPED | OUTPATIENT
Start: 2023-05-09

## 2023-05-09 RX ORDER — FEXOFENADINE HCL 180 MG/1
180 TABLET ORAL DAILY
COMMUNITY

## 2023-05-09 RX ORDER — LEVOTHYROXINE SODIUM 0.12 MG/1
125 TABLET ORAL DAILY
Qty: 90 TABLET | Refills: 0 | Status: SHIPPED | OUTPATIENT
Start: 2023-05-09

## 2023-05-09 RX ORDER — TAMSULOSIN HYDROCHLORIDE 0.4 MG/1
0.4 CAPSULE ORAL DAILY
Qty: 90 CAPSULE | Refills: 0 | Status: SHIPPED | OUTPATIENT
Start: 2023-05-09

## 2023-05-09 RX ORDER — OXYBUTYNIN CHLORIDE 5 MG/1
5 TABLET ORAL
Qty: 90 TABLET | Refills: 0 | Status: SHIPPED | OUTPATIENT
Start: 2023-05-09

## 2023-05-09 RX ORDER — SILDENAFIL 25 MG/1
25 TABLET, FILM COATED ORAL DAILY PRN
Qty: 30 TABLET | Refills: 0 | Status: SHIPPED | OUTPATIENT
Start: 2023-05-09

## 2023-05-09 NOTE — PROGRESS NOTES
Outpatient Note- Follow up     HPI:     Stephen , 47 y o  male  presents today for follow-up of chronic conditions  The patient has been doing well on current medication regimen  He has had a history of issues with frequent urination overnight and urgency  He is currently on oxybutynin 5 mg overnight and Flomax 0 4 mg daily  Between the 2 medications he has had a significant improvement in his sleep and also in the draining of his bladder  Most recently the patient has had an exacerbation of his seasonal allergies  He has been taking Allegra over-the-counter, and occasionally using Afrin and or Flonase to help with his symptoms  Unfortunately he has not had significant success and is looking for any options to help with his symptoms currently  He is using eyedrops to help with itching over-the-counter  Lastly I personally reviewed the labs with the patient  Patient has had a resolution of some of the previously abnormal values such as total bilirubin, direct bilirubin, and EGFR  His hemoglobin A1c has remained 5 9, his last hemoglobin A1c was in 2020  He did have a decreased TSH and normal T4  He is currently on 137 mcg of Synthroid  Patient denies any recent chest pain, shortness of breath, palpitations, nausea, vomiting, diarrhea, constipation, lightheadedness, dizziness  He does admit to significant itchy eyes, sneezing, increased nasal congestion, rhinorrhea, and sinus congestion  Past Medical History:   Diagnosis Date   • Allergic rhinitis    • Anemia    • Asthma 12/14/2021   • Disease of thyroid gland    • Hypothyroidism    • Nasal congestion       ROS:   Review of Systems   See HPI    OBJECTIVE  Vitals:    05/09/23 1723   BP: 114/70   Pulse: 69   Resp: 16   SpO2: 97%        Physical Exam  Constitutional:       General: He is not in acute distress  Appearance: Normal appearance  He is obese  He is not ill-appearing, toxic-appearing or diaphoretic        Comments: Incredibly stuffy nose, nasal voice   HENT:      Head: Normocephalic and atraumatic  Right Ear: Tympanic membrane, ear canal and external ear normal  There is no impacted cerumen  Left Ear: Tympanic membrane, ear canal and external ear normal  There is no impacted cerumen  Nose: Congestion ( Moderate to severe) and rhinorrhea present  Mouth/Throat:      Mouth: Mucous membranes are moist       Pharynx: Oropharynx is clear  No oropharyngeal exudate or posterior oropharyngeal erythema  Comments:  Moderate postnasal drip  Eyes:      General:         Right eye: No discharge  Left eye: No discharge  Extraocular Movements: Extraocular movements intact  Pupils: Pupils are equal, round, and reactive to light  Cardiovascular:      Rate and Rhythm: Normal rate and regular rhythm  Heart sounds: Normal heart sounds  No murmur heard  No friction rub  No gallop  Pulmonary:      Effort: Pulmonary effort is normal  No respiratory distress  Breath sounds: Normal breath sounds  No stridor  No wheezing, rhonchi or rales  Comments: No wheezing or prolonged expiratory phase  Abdominal:      General: Bowel sounds are normal  There is no distension  Palpations: Abdomen is soft  Tenderness: There is no abdominal tenderness  Neurological:      Mental Status: He is alert  ASSESSMENT AND PLAN   Deni Caruso was seen today for follow-up  Diagnoses and all orders for this visit:    Acquired hypothyroidism  The past two labs demonstrate a decrease in TSH  Therefore will decrease synthroid back to lower dose of 125mcg     -     levothyroxine (Synthroid) 125 mcg tablet; Take 1 tablet (125 mcg total) by mouth daily    Urinary frequency  Nocturia  Patient currently doing well on both ditropan and flomax    It seems counter intuitive and I reviewed this with the patient but he is urinating more fully during the day and not having discomfort and pressure over night, resulting in less nocturia  -     oxybutynin (DITROPAN) 5 mg tablet; Take 1 tablet (5 mg total) by mouth daily at bedtime  -     tamsulosin (FLOMAX) 0 4 mg; Take 1 capsule (0 4 mg total) by mouth daily    Mild intermittent asthma without complication  Patient requests refill in case his asthma flares with allergies  Seasonal allergies are the worst they have been in some time and he is interested in being prepared  -     albuterol (Proventil HFA) 90 mcg/act inhaler; Inhale 2 puffs every 6 (six) hours as needed for wheezing    Vasculogenic erectile dysfunction, unspecified vasculogenic erectile dysfunction type  Cilias was not effective at 10mg prior to being intimate  Therefore I recommended Viagra and the use of GoodRx at [facility] to help with cost     -     sildenafil (VIAGRA) 25 MG tablet; Take 1 tablet (25 mg total) by mouth daily as needed for erectile dysfunction    Seasonal allergies  Nasal congestion  Rhinorrhea  Patient previously using allegra  He is to attempt Astelin alone  If not improving consider return to allegra and Flonase  He should avoid any triggers the best he can and clean out filters in home to reduce risk of dust and molds     -     azelastine (ASTELIN) 0 1 % nasal spray; 1 spray into each nostril 2 (two) times a day Use in each nostril as directed        Bry Bernstein DO  [facility]  5/9/2023 6:07 PM

## 2023-05-22 ENCOUNTER — TELEPHONE (OUTPATIENT)
Dept: FAMILY MEDICINE CLINIC | Facility: CLINIC | Age: 53
End: 2023-05-22

## 2023-05-22 NOTE — TELEPHONE ENCOUNTER
This is a new change  Please schedule for a acute visit in next 24 hours with myself or other available provider  Thank you

## 2023-05-22 NOTE — TELEPHONE ENCOUNTER
Louise Hickman said he has been using the nasal spray but his symptoms haven't improved  He said his mucus is yellow and he feels congested  He is concerned he has an infection since he hasn't improved since 5/9  He would like to know if something can be sent in or if a follow up should be scheduled

## 2023-05-23 NOTE — TELEPHONE ENCOUNTER
Pt could not take off work to come in  He scheduled apt for next week    Told him he can go to urgent care in meantime

## 2023-05-26 ENCOUNTER — RA CDI HCC (OUTPATIENT)
Dept: OTHER | Facility: HOSPITAL | Age: 53
End: 2023-05-26

## 2023-05-26 NOTE — PROGRESS NOTES
[facility]  coding opportunities       Chart reviewed, no opportunity found: CHART REVIEWED, NO OPPORTUNITY FOUND        Patients Insurance        Commercial Insurance: Jadyn Gamble

## 2023-06-06 ENCOUNTER — OFFICE VISIT (OUTPATIENT)
Dept: FAMILY MEDICINE CLINIC | Facility: CLINIC | Age: 53
End: 2023-06-06
Payer: COMMERCIAL

## 2023-06-06 VITALS
OXYGEN SATURATION: 97 % | BODY MASS INDEX: 34.72 KG/M2 | WEIGHT: 248 LBS | HEART RATE: 75 BPM | SYSTOLIC BLOOD PRESSURE: 118 MMHG | HEIGHT: 71 IN | DIASTOLIC BLOOD PRESSURE: 82 MMHG | TEMPERATURE: 98.8 F | RESPIRATION RATE: 16 BRPM

## 2023-06-06 DIAGNOSIS — R09.82 PND (POST-NASAL DRIP): ICD-10-CM

## 2023-06-06 DIAGNOSIS — J45.21 MILD INTERMITTENT ASTHMA WITH ACUTE EXACERBATION: Primary | ICD-10-CM

## 2023-06-06 DIAGNOSIS — J30.2 SEASONAL ALLERGIES: ICD-10-CM

## 2023-06-06 DIAGNOSIS — J34.89 RHINORRHEA: ICD-10-CM

## 2023-06-06 DIAGNOSIS — J01.10 ACUTE NON-RECURRENT FRONTAL SINUSITIS: ICD-10-CM

## 2023-06-06 DIAGNOSIS — J34.89 SINUS PRESSURE: ICD-10-CM

## 2023-06-06 PROCEDURE — 99213 OFFICE O/P EST LOW 20 MIN: CPT | Performed by: FAMILY MEDICINE

## 2023-06-06 RX ORDER — AMOXICILLIN AND CLAVULANATE POTASSIUM 875; 125 MG/1; MG/1
1 TABLET, FILM COATED ORAL EVERY 12 HOURS SCHEDULED
Qty: 20 TABLET | Refills: 0 | Status: SHIPPED | OUTPATIENT
Start: 2023-06-06 | End: 2023-06-16

## 2023-06-06 RX ORDER — METHYLPREDNISOLONE 4 MG/1
TABLET ORAL
Qty: 21 EACH | Refills: 0 | Status: SHIPPED | OUTPATIENT
Start: 2023-06-06

## 2023-06-06 NOTE — PROGRESS NOTES
Outpatient Note- Follow up     HPI:     Stephen , 47 y o  male  presents today for continued upper respiratory symptoms  The patient has been dealing with severe allergies over the course of the last 4 to 6 weeks  We have attempted multiple treatments such as oral antihistamines, Flonase, Astelin without any significant improvement  The patient notes severe sinus congestion, pressure, postnasal drip, and rhinorrhea  The color of the mucus has been changing from a whitish-gray to a yellow-green color  He has not had any significant fever, chills, nausea, vomiting, lightheadedness, dizziness  He does note some mild tightness when he sleeps at night, but denies any significant wheezing  He does have a significant history of mild intermittent asthma  Past Medical History:   Diagnosis Date   • Allergic rhinitis    • Anemia    • Asthma 12/14/2021   • Disease of thyroid gland    • Hypothyroidism    • Nasal congestion       ROS:   Review of Systems   See HPI    OBJECTIVE  Vitals:    06/06/23 1659   BP: 118/82   Pulse: 75   Resp: 16   Temp: 98 8 °F (37 1 °C)   SpO2: 97%        Physical Exam  Constitutional:       General: He is not in acute distress  Appearance: Normal appearance  He is obese  He is not ill-appearing, toxic-appearing or diaphoretic  HENT:      Head: Normocephalic and atraumatic  Ears:      Comments: Bilateralmild bulging TMs  Increased vascularity bilaterally with mild erythema  Nose: Congestion and rhinorrhea present  Comments: Nasal voice, significant congestion noted with sniffing and attempting to move mucus  Mouth/Throat:      Mouth: Mucous membranes are moist       Pharynx: Oropharynx is clear  No oropharyngeal exudate or posterior oropharyngeal erythema  Comments: White, frothy, evidence of postnasal drip  Eyes:      General:         Right eye: No discharge  Left eye: No discharge  Extraocular Movements: Extraocular movements intact  Pupils: Pupils are equal, round, and reactive to light  Cardiovascular:      Rate and Rhythm: Normal rate and regular rhythm  Heart sounds: Normal heart sounds  No murmur heard  No friction rub  No gallop  Pulmonary:      Effort: Pulmonary effort is normal  No respiratory distress  Breath sounds: No stridor  Wheezing present  No rhonchi or rales  Comments: Decreased air movement in bilateral lower lobes  Wheezing in upper lobes  Abdominal:      General: Bowel sounds are normal  There is no distension  Palpations: Abdomen is soft  Tenderness: There is no abdominal tenderness  Musculoskeletal:      Cervical back: Neck supple  No tenderness  Lymphadenopathy:      Cervical: No cervical adenopathy  Neurological:      Mental Status: He is alert  ASSESSMENT AND PLAN   Kelsey Hernandez was seen today for sinusitis and cough  Diagnoses and all orders for this visit:    Mild intermittent asthma with acute exacerbation  Acute non-recurrent frontal sinusitis  Sinus pressure  Seasonal allergies  Rhinorrhea  PND (post-nasal drip)  Patient has continued seasonal allergies that likely progressed to acute rhinosinusitis  He has significant pressure, pain, and a change in mucus color  Will treat with Medrol Dosepak to help reduce inflammation and improve mucus movement  Patient also is likely an acute on chronic mild intermittent asthma  He has evidence of wheezing in the upper lobes with decreased air movement in the lower lobes  Augmentin for possible underlying sinus infection  Ears may also be transitioning to bilateral ear infection due to sinus inflammation  Patient will be calling back to inform me of the doctor that he would like to see through ENT  I agreed to send referral after obtaining my chart or call   -     methylPREDNISolone 4 MG tablet therapy pack; Use as directed on package  -     amoxicillin-clavulanate (AUGMENTIN) 875-125 mg per tablet;  Take 1 tablet by mouth every 12 (twelve) hours for 10 days    Misty Mendes DO  [facility]  6/6/2023 5:22 PM

## 2023-06-08 DIAGNOSIS — J30.2 SEASONAL ALLERGIES: ICD-10-CM

## 2023-06-08 DIAGNOSIS — J34.89 RHINORRHEA: ICD-10-CM

## 2023-06-08 DIAGNOSIS — R09.81 NASAL CONGESTION: ICD-10-CM

## 2023-06-08 RX ORDER — AZELASTINE HYDROCHLORIDE 137 UG/1
SPRAY, METERED NASAL
Qty: 30 ML | Refills: 0 | Status: SHIPPED | OUTPATIENT
Start: 2023-06-08

## 2023-07-08 DIAGNOSIS — J30.2 SEASONAL ALLERGIES: ICD-10-CM

## 2023-07-08 DIAGNOSIS — J34.89 RHINORRHEA: ICD-10-CM

## 2023-07-08 DIAGNOSIS — R09.81 NASAL CONGESTION: ICD-10-CM

## 2023-07-10 RX ORDER — AZELASTINE HYDROCHLORIDE 137 UG/1
SPRAY, METERED NASAL
Qty: 30 ML | Refills: 0 | Status: SHIPPED | OUTPATIENT
Start: 2023-07-10

## 2023-08-07 DIAGNOSIS — R35.1 NOCTURIA: ICD-10-CM

## 2023-08-07 DIAGNOSIS — E03.9 ACQUIRED HYPOTHYROIDISM: ICD-10-CM

## 2023-08-07 DIAGNOSIS — R35.0 URINARY FREQUENCY: ICD-10-CM

## 2023-08-07 RX ORDER — OXYBUTYNIN CHLORIDE 5 MG/1
5 TABLET ORAL
Qty: 90 TABLET | Refills: 0 | Status: SHIPPED | OUTPATIENT
Start: 2023-08-07

## 2023-08-07 RX ORDER — TAMSULOSIN HYDROCHLORIDE 0.4 MG/1
0.4 CAPSULE ORAL DAILY
Qty: 90 CAPSULE | Refills: 0 | Status: SHIPPED | OUTPATIENT
Start: 2023-08-07

## 2023-08-07 RX ORDER — LEVOTHYROXINE SODIUM 0.12 MG/1
125 TABLET ORAL DAILY
Qty: 90 TABLET | Refills: 0 | Status: SHIPPED | OUTPATIENT
Start: 2023-08-07

## 2023-09-26 ENCOUNTER — OFFICE VISIT (OUTPATIENT)
Dept: FAMILY MEDICINE CLINIC | Facility: CLINIC | Age: 53
End: 2023-09-26
Payer: COMMERCIAL

## 2023-09-26 VITALS
OXYGEN SATURATION: 99 % | RESPIRATION RATE: 16 BRPM | BODY MASS INDEX: 34.72 KG/M2 | HEIGHT: 71 IN | WEIGHT: 248 LBS | TEMPERATURE: 99.4 F | SYSTOLIC BLOOD PRESSURE: 110 MMHG | DIASTOLIC BLOOD PRESSURE: 70 MMHG | HEART RATE: 75 BPM

## 2023-09-26 DIAGNOSIS — J45.21 MILD INTERMITTENT ASTHMA WITH ACUTE EXACERBATION: ICD-10-CM

## 2023-09-26 DIAGNOSIS — R07.89 CHEST TIGHTNESS: ICD-10-CM

## 2023-09-26 DIAGNOSIS — R06.2 WHEEZING: ICD-10-CM

## 2023-09-26 DIAGNOSIS — R05.8 DRY COUGH: Primary | ICD-10-CM

## 2023-09-26 PROCEDURE — 99213 OFFICE O/P EST LOW 20 MIN: CPT | Performed by: FAMILY MEDICINE

## 2023-09-26 RX ORDER — ALBUTEROL SULFATE 90 UG/1
2 AEROSOL, METERED RESPIRATORY (INHALATION) EVERY 6 HOURS PRN
Qty: 18 G | Refills: 0 | Status: SHIPPED | OUTPATIENT
Start: 2023-09-26

## 2023-09-26 RX ORDER — PREDNISONE 10 MG/1
TABLET ORAL
Qty: 20 TABLET | Refills: 0 | Status: SHIPPED | OUTPATIENT
Start: 2023-09-26 | End: 2023-10-04

## 2023-09-26 RX ORDER — BENZONATATE 100 MG/1
100 CAPSULE ORAL 3 TIMES DAILY PRN
Qty: 20 CAPSULE | Refills: 0 | Status: SHIPPED | OUTPATIENT
Start: 2023-09-26

## 2023-09-26 NOTE — PATIENT INSTRUCTIONS
Please start using albuterol every 4 hours for day 1, every 6 hours/ for day 2, and on day 3 you may use albuterol as needed. Please start using prednisone 40 mg. It would be a taper over the course of the next 8 days. You may use the [employer] that were sent, this is a cough suppressant that helps especially at night, I do recommend taking it around an hour before bed or at least saving one of the doses for bedtime. If you do have any new issues with colored sputum or drainage please call the office and I will consider azithromycin.

## 2023-11-17 ENCOUNTER — RA CDI HCC (OUTPATIENT)
Dept: OTHER | Facility: HOSPITAL | Age: 53
End: 2023-11-17

## 2023-11-17 NOTE — PROGRESS NOTES
[facility] coding opportunities       Chart reviewed, no opportunity found: CHART REVIEWED, NO OPPORTUNITY FOUND        Patients Insurance        Commercial Insurance: [address]

## 2023-11-20 DIAGNOSIS — E03.9 ACQUIRED HYPOTHYROIDISM: ICD-10-CM

## 2023-11-21 RX ORDER — LEVOTHYROXINE SODIUM 0.12 MG/1
125 TABLET ORAL DAILY
Qty: 90 TABLET | Refills: 0 | Status: SHIPPED | OUTPATIENT
Start: 2023-11-21

## 2023-12-08 ENCOUNTER — OFFICE VISIT (OUTPATIENT)
Dept: FAMILY MEDICINE CLINIC | Facility: CLINIC | Age: 53
End: 2023-12-08
Payer: COMMERCIAL

## 2023-12-08 VITALS
SYSTOLIC BLOOD PRESSURE: 120 MMHG | WEIGHT: 261 LBS | DIASTOLIC BLOOD PRESSURE: 80 MMHG | OXYGEN SATURATION: 97 % | HEIGHT: 71 IN | HEART RATE: 65 BPM | BODY MASS INDEX: 36.54 KG/M2

## 2023-12-08 DIAGNOSIS — R09.81 NASAL CONGESTION: ICD-10-CM

## 2023-12-08 DIAGNOSIS — R35.0 URINARY FREQUENCY: Primary | ICD-10-CM

## 2023-12-08 DIAGNOSIS — J30.2 SEASONAL ALLERGIES: ICD-10-CM

## 2023-12-08 DIAGNOSIS — R35.1 NOCTURIA: ICD-10-CM

## 2023-12-08 DIAGNOSIS — R73.03 PREDIABETES: ICD-10-CM

## 2023-12-08 DIAGNOSIS — Z13.220 SCREENING CHOLESTEROL LEVEL: ICD-10-CM

## 2023-12-08 DIAGNOSIS — Z12.5 SCREENING FOR MALIGNANT NEOPLASM OF PROSTATE: ICD-10-CM

## 2023-12-08 DIAGNOSIS — J45.20 MILD INTERMITTENT ASTHMA WITHOUT COMPLICATION: ICD-10-CM

## 2023-12-08 DIAGNOSIS — I10 ESSENTIAL HYPERTENSION: ICD-10-CM

## 2023-12-08 DIAGNOSIS — J34.89 RHINORRHEA: ICD-10-CM

## 2023-12-08 DIAGNOSIS — E03.9 ACQUIRED HYPOTHYROIDISM: ICD-10-CM

## 2023-12-08 PROCEDURE — 99214 OFFICE O/P EST MOD 30 MIN: CPT | Performed by: FAMILY MEDICINE

## 2023-12-08 RX ORDER — HYDROCHLOROTHIAZIDE 12.5 MG/1
12.5 TABLET ORAL DAILY
Qty: 90 TABLET | Refills: 1 | Status: SHIPPED | OUTPATIENT
Start: 2023-12-08

## 2023-12-08 RX ORDER — AZELASTINE HYDROCHLORIDE 137 UG/1
1 SPRAY, METERED NASAL 2 TIMES DAILY
Qty: 30 ML | Refills: 1 | Status: SHIPPED | OUTPATIENT
Start: 2023-12-08

## 2023-12-08 RX ORDER — TAMSULOSIN HYDROCHLORIDE 0.4 MG/1
0.4 CAPSULE ORAL DAILY
Qty: 90 CAPSULE | Refills: 1 | Status: SHIPPED | OUTPATIENT
Start: 2023-12-08

## 2023-12-08 NOTE — PROGRESS NOTES
Outpatient Note- Follow up     HPI:     Adrian , 47 y.o. male  presents today for follow-up of chronic conditions. The patient has significant seasonal allergies, hypothyroidism, hypertension, asthma, BPH, and mixed hyperlipidemia. The patient has been taking his medication appropriately and on presentation his blood pressure is within normal limits. He also finds that the medication for his BPH has been controlling his symptoms, he has no significant postvoid dribbling or nocturia. He is interested in following up with PSA for further evaluation. Other labs that have not been evaluated in over 6 months include his hemoglobin A1c that was in the prediabetic range at last evaluation. He also has evidence of mixed hyperlipidemia in the past which she is currently not on any medication. Overall he has been doing well, does admit to gaining some weight. He is looking into weight loss/diet over the next several months after the holidays. He knows to return to the office if he has any questions or concerns. He denies any fever, chills, nausea, vomiting, lightheadedness, dizziness, palpitations, chest pain, shortness of breath, diarrhea, constipation. Past Medical History:   Diagnosis Date    Allergic rhinitis     Anemia     Asthma 12/14/2021    Disease of thyroid gland     Hypothyroidism     Nasal congestion       ROS:   Review of Systems   See HPI    OBJECTIVE  Vitals:    12/08/23 1003   BP: 120/80   Pulse: 65   SpO2: 97%      Physical Exam  Constitutional:       General: He is not in acute distress. Appearance: Normal appearance. He is obese. He is not ill-appearing, toxic-appearing or diaphoretic. HENT:      Head: Normocephalic and atraumatic. Right Ear: Tympanic membrane, ear canal and external ear normal. There is no impacted cerumen. Left Ear: Tympanic membrane, ear canal and external ear normal. There is no impacted cerumen. Nose: Nose normal. No congestion or rhinorrhea. Cardiovascular:      Rate and Rhythm: Normal rate and regular rhythm. Heart sounds: Normal heart sounds. No murmur heard. No friction rub. No gallop. Pulmonary:      Effort: Pulmonary effort is normal. No respiratory distress. Breath sounds: Normal breath sounds. No stridor. No wheezing, rhonchi or rales. Musculoskeletal:      Cervical back: Neck supple. No tenderness. Lymphadenopathy:      Cervical: No cervical adenopathy. Skin:     Findings: Lesion (Dark, raised lesion on the right forearm) present. No erythema or rash. Neurological:      Mental Status: He is alert. I personally reviewed patient's prior labs in 5/2023 to determine follow labs required. ASSESSMENT AND PLAN   Sandra Mustafa was seen today for follow-up. Diagnoses and all orders for this visit:    Urinary frequency  Nocturia  Sable. Patient requires both flomax and oxybutynin to avoid excessive urination at night and allow proper urination during the day. We reviewed that tese medicaiton are antagonistic but they are working for the patient. -     tamsulosin (FLOMAX) 0.4 mg; Take 1 capsule (0.4 mg total) by mouth daily    Essential hypertension  Stable. Will continue current regimen of HCTZ 12.5mg.    -     hydrochlorothiazide (HYDRODIURIL) 12.5 mg tablet; Take 1 tablet (12.5 mg total) by mouth daily    Seasonal allergies  Nasal congestion  Rhinorrhea  Requests refill of medication. The patient has been doing well on this medication for his seasonal allergies. -     Azelastine HCl 137 MCG/SPRAY SOLN; 1 spray into each nostril 2 (two) times a day    Prediabetes  Previous labs show elevated fasting sugars. The patient is to obtain follow up labs since it has been greater than 6 months.   -     HEMOGLOBIN A1C W/ EAG ESTIMATION; Future  -     Comprehensive metabolic panel; Future    Acquired hypothyroidism  Currently on levothyroxine 125mcg. Recommended follow labs to ensure proper dosing.   Last dose change did not receive a follow confirmation of proper dosing from 5/2023.   -     TSH, 3rd generation with Free T4 reflex; Future    Mild intermittent asthma without complication  Stable. No recent exacerbation. Screening for malignant neoplasm of prostate  Screening cholesterol level  Since patient is obtaining other labs to review underlying issues. Will also review the PSA and lipid panel.   -     Lipid panel; Future  -     PSA, Total Screen;  Future        Neftaly Duong DO  [facility]  12/8/2023 10:46 AM

## 2023-12-14 LAB
ALBUMIN SERPL-MCNC: 4.1 G/DL (ref 3.6–5.1)
ALBUMIN/GLOB SERPL: 1.3 (CALC) (ref 1–2.5)
ALP SERPL-CCNC: 64 U/L (ref 35–144)
ALT SERPL-CCNC: 19 U/L (ref 9–46)
AST SERPL-CCNC: 22 U/L (ref 10–35)
BILIRUB SERPL-MCNC: 0.9 MG/DL (ref 0.2–1.2)
BUN SERPL-MCNC: 15 MG/DL (ref 7–25)
BUN/CREAT SERPL: 11 (CALC) (ref 6–22)
CALCIUM SERPL-MCNC: 9 MG/DL (ref 8.6–10.3)
CHLORIDE SERPL-SCNC: 102 MMOL/L (ref 98–110)
CHOLEST SERPL-MCNC: 197 MG/DL
CHOLEST/HDLC SERPL: 4.6 (CALC)
CO2 SERPL-SCNC: 32 MMOL/L (ref 20–32)
CREAT SERPL-MCNC: 1.33 MG/DL (ref 0.7–1.3)
EST. AVERAGE GLUCOSE BLD GHB EST-MCNC: 126 MG/DL
EST. AVERAGE GLUCOSE BLD GHB EST-SCNC: 7 MMOL/L
GFR/BSA.PRED SERPLBLD CYS-BASED-ARV: 64 ML/MIN/1.73M2
GLOBULIN SER CALC-MCNC: 3.1 G/DL (CALC) (ref 1.9–3.7)
GLUCOSE SERPL-MCNC: 113 MG/DL (ref 65–99)
HBA1C MFR BLD: 6 % OF TOTAL HGB
HDLC SERPL-MCNC: 43 MG/DL
LDLC SERPL CALC-MCNC: 137 MG/DL (CALC)
NONHDLC SERPL-MCNC: 154 MG/DL (CALC)
POTASSIUM SERPL-SCNC: 3.7 MMOL/L (ref 3.5–5.3)
PROT SERPL-MCNC: 7.2 G/DL (ref 6.1–8.1)
PSA SERPL-MCNC: 0.77 NG/ML
SODIUM SERPL-SCNC: 141 MMOL/L (ref 135–146)
TRIGL SERPL-MCNC: 80 MG/DL
TSH SERPL-ACNC: 0.57 MIU/L (ref 0.4–4.5)

## 2023-12-26 ENCOUNTER — TELEPHONE (OUTPATIENT)
Dept: FAMILY MEDICINE CLINIC | Facility: CLINIC | Age: 53
End: 2023-12-26

## 2023-12-26 DIAGNOSIS — R73.03 PREDIABETES: ICD-10-CM

## 2023-12-26 DIAGNOSIS — E78.2 MIXED HYPERLIPIDEMIA: ICD-10-CM

## 2023-12-26 DIAGNOSIS — I10 ESSENTIAL HYPERTENSION: Primary | ICD-10-CM

## 2023-12-26 DIAGNOSIS — Z13.0 SCREENING FOR DEFICIENCY ANEMIA: ICD-10-CM

## 2023-12-26 NOTE — TELEPHONE ENCOUNTER
Called and reviewed patient's labs.  Evidence of elevated cholesterol and A1c.  He will monitor his diet and decrease weight over the next 6 months.  He does note that he has been eating and drinking more lately.  Therefore we will follow-up in 6 months and if elevated at that time, will seriously consider medication management at that time.

## 2024-01-01 ENCOUNTER — APPOINTMENT (EMERGENCY)
Dept: RADIOLOGY | Facility: HOSPITAL | Age: 54
End: 2024-01-01
Payer: COMMERCIAL

## 2024-01-01 ENCOUNTER — HOSPITAL ENCOUNTER (EMERGENCY)
Facility: HOSPITAL | Age: 54
Discharge: HOME | End: 2024-01-01
Attending: EMERGENCY MEDICINE
Payer: COMMERCIAL

## 2024-01-01 VITALS
TEMPERATURE: 97.4 F | HEART RATE: 62 BPM | RESPIRATION RATE: 18 BRPM | DIASTOLIC BLOOD PRESSURE: 75 MMHG | SYSTOLIC BLOOD PRESSURE: 138 MMHG | OXYGEN SATURATION: 99 %

## 2024-01-01 DIAGNOSIS — M10.9 GOUT: Primary | ICD-10-CM

## 2024-01-01 PROCEDURE — 99284 EMERGENCY DEPT VISIT MOD MDM: CPT | Performed by: EMERGENCY MEDICINE

## 2024-01-01 PROCEDURE — 73630 X-RAY EXAM OF FOOT: CPT

## 2024-01-01 PROCEDURE — 99283 EMERGENCY DEPT VISIT LOW MDM: CPT

## 2024-01-01 RX ORDER — PREDNISONE 20 MG/1
40 TABLET ORAL DAILY
Qty: 14 TABLET | Refills: 0 | Status: SHIPPED | OUTPATIENT
Start: 2024-01-01 | End: 2024-01-08

## 2024-01-02 NOTE — DISCHARGE INSTRUCTIONS
Your x-ray was normal.  I have prescribed a 7-day course of prednisone.  You should take 40 mg of prednisone daily until your symptoms resolve.  The doctor that you are seeing on Thursday may recommend tapering the steroid after your symptoms resolve or a shorter course.

## 2024-01-02 NOTE — ED PROVIDER NOTES
History  Chief Complaint   Patient presents with    Foot Pain     Pt reports right foot pain and swelling  that started a week ago. Pt was seen at [facility] were done and pt was told it could be gout or a hairline fracture. pt took 300 mg of motrin 30 minutes pta. No known injury to foot        53-year-old male with hypertension and prediabetes presents with pain and swelling to his right first MTP joint.  He states this started 1 or 2 weeks ago and he was seen in urgent care and had an x-ray and was told that it was normal.  He was advised that he may have gout.  He has been taking ibuprofen since then.  He states that he has not been drinking much and has been trying to avoid dietary indiscretions.  Despite this over the last couple days the area has become more painful and swollen again.  He denies any prior history of gout.  No inciting injury.  He is able to ambulate without difficulty        Prior to Admission Medications   Prescriptions Last Dose Informant Patient Reported? Taking?   Azelastine HCl 137 MCG/SPRAY SOLN   No No   Si spray into each nostril 2 (two) times a day   Cholecalciferol (VITAMIN D) 125 MCG (5000 UT) CAPS  Self Yes No   Sig: Take by mouth   Multiple Vitamin (MULTIVITAMIN) capsule  Self Yes No   Sig: Take 1 capsule by mouth daily   albuterol (Proventil HFA) 90 mcg/act inhaler   No No   Sig: Inhale 2 puffs every 6 (six) hours as needed for wheezing   fexofenadine (ALLEGRA) 180 MG tablet   Yes No   Sig: Take 180 mg by mouth daily   fluticasone (FLONASE) 50 mcg/act nasal spray  Self No No   Si sprays into each nostril 2 (two) times a day   hydrochlorothiazide (HYDRODIURIL) 12.5 mg tablet   No No   Sig: Take 1 tablet (12.5 mg total) by mouth daily   levothyroxine 125 mcg tablet   No No   Sig: TAKE 1 TABLET BY MOUTH EVERY DAY   oxybutynin (DITROPAN) 5 mg tablet   No No   Sig: TAKE 1 TABLET BY MOUTH AT BEDTIME   tamsulosin (FLOMAX) 0.4 mg   No No   Sig: Take 1 capsule (0.4 mg  total) by mouth daily      Facility-Administered Medications: None       Past Medical History:   Diagnosis Date    Allergic rhinitis     Anemia     Asthma 12/14/2021    Disease of thyroid gland     Hypothyroidism     Nasal congestion        Past Surgical History:   Procedure Laterality Date    NO PAST SURGERIES         Family History   Problem Relation Age of Onset    Breast cancer Mother     Diabetes Father     Coronary artery disease Neg Hx     Colon cancer Neg Hx     Testicular cancer Neg Hx      I have reviewed and agree with the history as documented.    E-Cigarette/Vaping    E-Cigarette Use Never User      E-Cigarette/Vaping Substances    Nicotine No     THC No     CBD No     Flavoring No     Other No     Unknown No      Social History     Tobacco Use    Smoking status: Never    Smokeless tobacco: Never   Vaping Use    Vaping status: Never Used   Substance Use Topics    Alcohol use: Yes     Comment: 1 drink per month    Drug use: No       Review of Systems   All other systems reviewed and are negative.      Physical Exam  Physical Exam  Constitutional:       Appearance: Normal appearance.   Cardiovascular:      Rate and Rhythm: Normal rate.   Pulmonary:      Effort: Pulmonary effort is normal.   Musculoskeletal:         General: Normal range of motion.      Comments: Mild erythema swelling and warmth of the right first MTP joint, mild tenderness to palpation of the medial aspect of the joint, normal range of motion, swelling does not extend to the toe or adjacent MTP joints   Skin:     General: Skin is warm and dry.   Neurological:      Mental Status: He is alert and oriented to person, place, and time.   Psychiatric:         Mood and Affect: Mood normal.         Behavior: Behavior normal.         Vital Signs  ED Triage Vitals [01/01/24 2026]   Temperature Pulse Respirations Blood Pressure SpO2   (!) 97.4 °F (36.3 °C) 62 18 138/75 99 %      Temp Source Heart Rate Source Patient Position - Orthostatic VS BP  Location FiO2 (%)   Oral Monitor Sitting Left arm --      Pain Score       --           Vitals:    01/01/24 2026   BP: 138/75   Pulse: 62   Patient Position - Orthostatic VS: Sitting         Visual Acuity      ED Medications  Medications - No data to display    Diagnostic Studies  Results Reviewed       None                   XR foot 3+ views RIGHT    (Results Pending)              Procedures  Procedures         ED Course         53-year-old male with 1 to 2 weeks of right first MTP joint swelling.  Exam is most consistent with mild gout flare.  X-ray shows no acute bony abnormality per my independent interpretation.  As patient is already taking ibuprofen without relief we will prescribe short course of prednisone.  Patient has podiatry follow-up on Thursday of this week to advise him further.                      SBIRT 20yo+      Flowsheet Row Most Recent Value   Initial Alcohol Screen: US AUDIT-C     1. How often do you have a drink containing alcohol? 0 Filed at: 01/01/2024 2034   2. How many drinks containing alcohol do you have on a typical day you are drinking?  0 Filed at: 01/01/2024 2034   3a. Male UNDER 65: How often do you have five or more drinks on one occasion? 0 Filed at: 01/01/2024 2034   Audit-C Score 0 Filed at: 01/01/2024 2034   RAFI: How many times in the past year have you...    Used an illegal drug or used a prescription medication for non-medical reasons? Never Filed at: 01/01/2024 2034                      Medical Decision Making  Amount and/or Complexity of Data Reviewed  Radiology: ordered.    Risk  Prescription drug management.             Disposition  Final diagnoses:   Gout     Time reflects when diagnosis was documented in both MDM as applicable and the Disposition within this note       Time User Action Codes Description Comment    1/1/2024  9:15 PM Khushi Farris Add [M10.9] Gout           ED Disposition       ED Disposition   Discharge    Condition   Stable    Date/Time   Mon Jan 1,  2024 2115    Comment   Octavio Norris discharge to home/self care.                   Follow-up Information    None         Discharge Medication List as of 1/1/2024  9:21 PM        START taking these medications    Details   predniSONE 20 mg tablet Take 2 tablets (40 mg total) by mouth daily for 7 days, Starting Mon 1/1/2024, Until Mon 1/8/2024, Normal           CONTINUE these medications which have NOT CHANGED    Details   albuterol (Proventil HFA) 90 mcg/act inhaler Inhale 2 puffs every 6 (six) hours as needed for wheezing, Starting Tue 9/26/2023, Normal      Azelastine HCl 137 MCG/SPRAY SOLN 1 spray into each nostril 2 (two) times a day, Starting Fri 12/8/2023, Normal      Cholecalciferol (VITAMIN D) 125 MCG (5000 UT) CAPS Take by mouth, Historical Med      fexofenadine (ALLEGRA) 180 MG tablet Take 180 mg by mouth daily, Historical Med      fluticasone (FLONASE) 50 mcg/act nasal spray 2 sprays into each nostril 2 (two) times a day, Starting Mon 1/18/2021, Normal      hydrochlorothiazide (HYDRODIURIL) 12.5 mg tablet Take 1 tablet (12.5 mg total) by mouth daily, Starting Fri 12/8/2023, Normal      levothyroxine 125 mcg tablet TAKE 1 TABLET BY MOUTH EVERY DAY, Starting Tue 11/21/2023, Normal      Multiple Vitamin (MULTIVITAMIN) capsule Take 1 capsule by mouth daily, Historical Med      oxybutynin (DITROPAN) 5 mg tablet TAKE 1 TABLET BY MOUTH AT BEDTIME, Starting Mon 8/7/2023, Normal      tamsulosin (FLOMAX) 0.4 mg Take 1 capsule (0.4 mg total) by mouth daily, Starting Fri 12/8/2023, Normal             No discharge procedures on file.    PDMP Review       None            ED Provider  Electronically Signed by             Khushi Farris MD  01/01/24 2413

## 2024-01-04 ENCOUNTER — OFFICE VISIT (OUTPATIENT)
Age: 54
End: 2024-01-04
Payer: COMMERCIAL

## 2024-01-04 VITALS
HEIGHT: 71 IN | DIASTOLIC BLOOD PRESSURE: 88 MMHG | BODY MASS INDEX: 36.54 KG/M2 | HEART RATE: 66 BPM | WEIGHT: 261 LBS | SYSTOLIC BLOOD PRESSURE: 139 MMHG

## 2024-01-04 DIAGNOSIS — M10.071 ACUTE IDIOPATHIC GOUT INVOLVING TOE OF RIGHT FOOT: Primary | ICD-10-CM

## 2024-01-04 PROCEDURE — 99204 OFFICE O/P NEW MOD 45 MIN: CPT | Performed by: STUDENT IN AN ORGANIZED HEALTH CARE EDUCATION/TRAINING PROGRAM

## 2024-01-04 RX ORDER — COLCHICINE 0.6 MG/1
0.6 TABLET ORAL DAILY
Qty: 5 TABLET | Refills: 0 | Status: SHIPPED | OUTPATIENT
Start: 2024-01-04

## 2024-01-04 NOTE — PATIENT INSTRUCTIONS
Purchase a supportive over-the-counter pair of inserts such as Superfeet, powersteps, tread labs

## 2024-01-04 NOTE — PROGRESS NOTES
This patient was seen on 1/4/2024.    My role is Foot , Ankle, and Wound Specialist    ASSESSMENT     Diagnoses and all orders for this visit:    Acute idiopathic gout involving toe of right foot  -     colchicine (COLCRYS) 0.6 mg tablet; Take 1 tablet (0.6 mg total) by mouth daily    Other orders  -     diclofenac sodium (VOLTAREN) 50 mg EC tablet         Problem List Items Addressed This Visit    None  Visit Diagnoses       Acute idiopathic gout involving toe of right foot    -  Primary    Relevant Medications    colchicine (COLCRYS) 0.6 mg tablet          PLAN  -Patient was educated regarding their condition  -Rx colchicine  -Educated patient on lifestyle changes including eating fruits and vegitables more often, staying hydrated, reducing consumption of soda/artificially sweetened products, red meat, shellfish, alcohol. Positive diet influences were also discussed such as adding a glass of cherry juice or green tea daily.  -Return to clinic 4-weeks    -X-ray of right foot from 1/1/2024 personally reviewed: No acute osseous abnormality to right foot to suggest fracture.  Soft tissues are normal with no calcifications present       SUBJECTIVE    Chief Complaint:  R big toe pain     Patient ID: Octavio Myrna     1/4/2024: Octavio is a pleasant 53-year-old male who presents today with right big toe pain.  He states that his right foot has been somewhat swollen for the past 6 months or so.  He states that he had a flareup to his right big toe joint approximately 2 weeks ago which went away.  Last Friday he felt his big toe joint getting swollen again he went to [facility] first where he was given an anti-inflammatory medication.  He was secondarily prescribed prednisone on 1/1/2024 after a visit to the emergency department for the same issue.  He had x-rays taken which revealed no fractures or abnormalities to the soft tissues.  He does not have any history of injury or open wounds.        The following portions of the  "patient's history were reviewed and updated as appropriate: allergies, current medications, past family history, past medical history, past social history, past surgical history and problem list.    Review of Systems   Constitutional: Negative.    Respiratory: Negative.     Cardiovascular: Negative.    Gastrointestinal: Negative.    Genitourinary: Negative.    Musculoskeletal:  Positive for arthralgias.   Skin:  Positive for color change.         OBJECTIVE      /88   Pulse 66   Ht 5' 11\" (1.803 m) Comment: verbal  Wt 118 kg (261 lb) Comment: verbal  BMI 36.40 kg/m²        Physical Exam  Constitutional:       Appearance: Normal appearance.   HENT:      Head: Normocephalic and atraumatic.   Eyes:      General:         Right eye: No discharge.         Left eye: No discharge.   Cardiovascular:      Rate and Rhythm: Normal rate and regular rhythm.      Pulses:           Dorsalis pedis pulses are 2+ on the right side and 2+ on the left side.        Posterior tibial pulses are 2+ on the right side and 2+ on the left side.   Pulmonary:      Effort: Pulmonary effort is normal.      Breath sounds: Normal breath sounds.   Skin:     General: Skin is warm.      Capillary Refill: Capillary refill takes less than 2 seconds.   Neurological:      Mental Status: He is alert and oriented to person, place, and time.      Sensory: Sensation is intact. No sensory deficit.   Psychiatric:         Mood and Affect: Mood normal.         Vascular:  -DP and PT pulses intact b/l  -Capillary refill time <2 sec b/l  -Skin temp: Warmer to the right side in comparison to the contralateral side    MSK:  -Pain on palpation to R first MTPJ globally  -MMT is 5/5 to all muscle compartments of the lower extremity  -Ankle dorsiflexion >10 degrees with knee extended and knee flexed b/l    Neuro:  -Light sensation intact bilaterally  -Protective sensation intact bilaterally    Derm:  -No lesions, abrasions, or open wounds noted  -I note erythema " and edema to the right first MTPJ

## 2024-02-01 ENCOUNTER — OFFICE VISIT (OUTPATIENT)
Age: 54
End: 2024-02-01
Payer: COMMERCIAL

## 2024-02-01 VITALS
SYSTOLIC BLOOD PRESSURE: 137 MMHG | DIASTOLIC BLOOD PRESSURE: 84 MMHG | HEIGHT: 71 IN | BODY MASS INDEX: 36.54 KG/M2 | HEART RATE: 76 BPM | WEIGHT: 261 LBS

## 2024-02-01 DIAGNOSIS — M10.071 ACUTE IDIOPATHIC GOUT INVOLVING TOE OF RIGHT FOOT: ICD-10-CM

## 2024-02-01 PROCEDURE — 99213 OFFICE O/P EST LOW 20 MIN: CPT | Performed by: STUDENT IN AN ORGANIZED HEALTH CARE EDUCATION/TRAINING PROGRAM

## 2024-02-01 RX ORDER — COLCHICINE 0.6 MG/1
0.6 TABLET ORAL DAILY
Qty: 5 TABLET | Refills: 1 | Status: SHIPPED | OUTPATIENT
Start: 2024-02-01

## 2024-02-02 NOTE — PROGRESS NOTES
This patient was seen on  2/1/24 .    My role is Foot , Ankle, and Wound Specialist    ASSESSMENT     Diagnoses and all orders for this visit:    Acute idiopathic gout involving toe of right foot  -     colchicine (COLCRYS) 0.6 mg tablet; Take 1 tablet (0.6 mg total) by mouth daily         Problem List Items Addressed This Visit    None  Visit Diagnoses       Acute idiopathic gout involving toe of right foot        Relevant Medications    colchicine (COLCRYS) 0.6 mg tablet          PLAN  -Patient was educated regarding their condition  -Rx colchicine  -Educated patient on lifestyle changes including eating fruits and vegitables more often, staying hydrated, reducing consumption of soda/artificially sweetened products, red meat, shellfish, alcohol. Positive diet influences were also discussed such as adding a glass of cherry juice or green tea daily.  -Return to clinic as needed for new or worsening podiatric concerns    SUBJECTIVE    Chief Complaint:  Repeat right foot pain     Patient ID: Octavio Myrna     1/4/2024: Octavio is a pleasant 53-year-old male who presents today with right big toe pain.  He states that his right foot has been somewhat swollen for the past 6 months or so.  He states that he had a flareup to his right big toe joint approximately 2 weeks ago which went away.  Last Friday he felt his big toe joint getting swollen again [facility] an anti-inflammatory medication.  He was secondarily prescribed prednisone on 1/1/2024 after a visit to the emergency department for the same issue.  He had x-rays taken which revealed no fractures or abnormalities to the soft tissues.  He does not have any history of injury or open wounds.    2/1/2024: Octavio states that he is was doing extremely well since his last visit until about a week ago when he started to make adjustments to his diet that negatively affected his right foot.  He endorses eating red meat and drinking some  "sangria as well as drinking artificially sweetened beverages for a day or 2, he states that when asked that he woke up with right foot pain once again.  He believes this to be the cause of his right foot pain.  He states that before this he had made a concerted effort to drink a lot of water and also changed his diet to the Mediterranean diet which she believes helped tremendously.        The following portions of the patient's history were reviewed and updated as appropriate: allergies, current medications, past family history, past medical history, past social history, past surgical history and problem list.    Review of Systems   Constitutional: Negative.    Respiratory: Negative.     Cardiovascular: Negative.    Gastrointestinal: Negative.    Genitourinary: Negative.    Musculoskeletal:  Positive for arthralgias.   Skin:  Positive for color change.         OBJECTIVE      /84   Pulse 76   Ht 5' 11\" (1.803 m)   Wt 118 kg (261 lb)   BMI 36.40 kg/m²        Physical Exam  Constitutional:       Appearance: Normal appearance.   HENT:      Head: Normocephalic and atraumatic.   Eyes:      General:         Right eye: No discharge.         Left eye: No discharge.   Cardiovascular:      Rate and Rhythm: Normal rate and regular rhythm.      Pulses:           Dorsalis pedis pulses are 2+ on the right side and 2+ on the left side.        Posterior tibial pulses are 2+ on the right side and 2+ on the left side.   Pulmonary:      Effort: Pulmonary effort is normal.      Breath sounds: Normal breath sounds.   Skin:     General: Skin is warm.      Capillary Refill: Capillary refill takes less than 2 seconds.   Neurological:      Mental Status: He is alert and oriented to person, place, and time.      Sensory: Sensation is intact. No sensory deficit.   Psychiatric:         Mood and Affect: Mood normal.           Vascular:  -DP and PT pulses intact b/l  -Capillary refill time <2 sec b/l  -Skin temp: Warmer to the right " side in comparison to the contralateral side     MSK:  -Pain on palpation to R first MTPJ globally  -MMT is 5/5 to all muscle compartments of the lower extremity  -Ankle dorsiflexion >10 degrees with knee extended and knee flexed b/l     Neuro:  -Light sensation intact bilaterally  -Protective sensation intact bilaterally     Derm:  -No lesions, abrasions, or open wounds noted  -I note erythema and edema to the right first MTPJ

## 2024-02-17 DIAGNOSIS — E03.9 ACQUIRED HYPOTHYROIDISM: ICD-10-CM

## 2024-02-17 RX ORDER — LEVOTHYROXINE SODIUM 0.12 MG/1
125 TABLET ORAL DAILY
Qty: 90 TABLET | Refills: 1 | Status: SHIPPED | OUTPATIENT
Start: 2024-02-17

## 2024-02-29 NOTE — PROGRESS NOTES
Progress Note - Cardiology Office  [facility]    Octavio Norris 53 y.o. male MRN: [id number]  : 1970  Encounter: [id number]      ASSESSMENT:  Fluctuations in heart rate  Often related to anxiety/panic attack and sometimes postexercise     Heart rate today 66 at rest     24-hour Holter monitor, 2022:  Sinus rhythm with rare PACs and PVCs  No major symptoms reported     TTE, 2022:  EF 60%, trace MR, trace to mild TR     History of iron deficiency anemia     Hypertension  BP today is 124/80 mmHg with heart rate of 66/min  On Hctz     Hypothyroidism     Obesity, BMI 34.03->33.75     History of anxiety and Panic Disorders     Nuclear stress test, 2017  Impression:     Normal  exercise nuclear study at an adequate workload >85% of the   predicted maximum heart rate. The images reveal no   evidence for myocardial ischemia or myocardial infarction   The LVEF is  50 %      Lower extremity venous duplex, 2021  RIGHT:  No evidence of DVT or SVT in the right lower extremity                Right anterior mid calf skin thickening with mild   subcutaneous edema noted at patient's area of concern                No evidence of SVT or fluid collection at patient's area of   concern      Prediabetes, hemoglobin A1c 6.2    RECOMMENDATIONS:  Low-salt diet  Regular cardiovascular exercise  Continue weight loss strategies        Please call [phone] if any questions.    HPI :     Octavio Norris is a 53 y.o. year old male who came for follow up.  Patient feels fine and denies any symptoms.  He is working on losing weight since his lobe and A1c was 6.0, and the prediabetic range    REVIEW OF SYSTEMS:  Denies any new or acute cardiac symptoms.  Denies chest pain, dyspnea, palpitations or syncope      Historical Information   Past Medical History:   Diagnosis Date    Allergic rhinitis     Anemia     Asthma 2021    Disease of thyroid gland     Hypothyroidism     Nasal congestion   "    Past Surgical History:   Procedure Laterality Date    NO PAST SURGERIES       Social History     Substance and Sexual Activity   Alcohol Use Yes    Comment: 1 drink per month     Social History     Substance and Sexual Activity   Drug Use No     Social History     Tobacco Use   Smoking Status Never   Smokeless Tobacco Never     Family History:   Family History   Problem Relation Age of Onset    Breast cancer Mother     Diabetes Father     Coronary artery disease Neg Hx     Colon cancer Neg Hx     Testicular cancer Neg Hx        Meds/Allergies     Allergies   Allergen Reactions    Other Other (See Comments)     Seasonal       Current Outpatient Medications:     albuterol (Proventil HFA) 90 mcg/act inhaler, Inhale 2 puffs every 6 (six) hours as needed for wheezing, Disp: 18 g, Rfl: 0    Azelastine HCl 137 MCG/SPRAY SOLN, 1 spray into each nostril 2 (two) times a day, Disp: 30 mL, Rfl: 1    Cholecalciferol (VITAMIN D) 125 MCG (5000 UT) CAPS, Take by mouth, Disp: , Rfl:     colchicine (COLCRYS) 0.6 mg tablet, Take 1 tablet (0.6 mg total) by mouth daily, Disp: 5 tablet, Rfl: 1    fexofenadine (ALLEGRA) 180 MG tablet, Take 180 mg by mouth daily, Disp: , Rfl:     fluticasone (FLONASE) 50 mcg/act nasal spray, 2 sprays into each nostril 2 (two) times a day, Disp: 1 Bottle, Rfl: 5    hydrochlorothiazide (HYDRODIURIL) 12.5 mg tablet, Take 1 tablet (12.5 mg total) by mouth daily, Disp: 90 tablet, Rfl: 1    levothyroxine 125 mcg tablet, TAKE 1 TABLET BY MOUTH EVERY DAY, Disp: 90 tablet, Rfl: 1    Multiple Vitamin (MULTIVITAMIN) capsule, Take 1 capsule by mouth daily, Disp: , Rfl:     oxybutynin (DITROPAN) 5 mg tablet, TAKE 1 TABLET BY MOUTH AT BEDTIME, Disp: 90 tablet, Rfl: 0    tamsulosin (FLOMAX) 0.4 mg, Take 1 capsule (0.4 mg total) by mouth daily, Disp: 90 capsule, Rfl: 1    Vitals: Blood pressure 124/80, pulse 66, height 5' 11\" (1.803 m), weight 110 kg (242 lb), SpO2 99%.    Body mass index is 33.75 kg/m².  Vitals:    " 24 0757   Weight: 110 kg (242 lb)     BP Readings from Last 3 Encounters:   24 124/80   24 137/84   24 139/88       Physical Exam:  Physical Exam    Neurologic:  Alert & oriented x 3, no new focal deficits, Not in any acute distress,  Constitutional: Obese  Eyes:  Pupil equal and reacting to light, conjunctiva normal,   HENT:  Atraumatic, oropharynx moist, Neck- normal range of motion, no tenderness,  Neck supple, No JVP, No LNP   Respiratory:  Bilateral air entry, mostly clear to auscultation  Cardiovascular: S1-S2 regular with a I/VI systolic murmur   GI:  Soft, nondistended, normal bowel sounds, nontender, no hepatosplenomegaly appreciated.  Musculoskeletal:  No edema, no tenderness, no deformities.   Skin:  Well hydrated, no rash   Lymphatic:  No lymphadenopathy noted   Extremities:  No edema and distal pulses are present        Diagnostic Studies Review Cardio:      EKG: Normal sinus rhythm, heart rate 66/min    Cardiac testing:       Results for orders placed during the hospital encounter of 22    Echo complete w/ contrast if indicated    Interpretation Summary    Left Ventricle: Left ventricular cavity size is normal. Wall thickness is normal. The left ventricular ejection fraction is 60% by visual estimation. Systolic function is normal. Wall motion is normal. Diastolic function is normal for age.    Mitral Valve: There is trace regurgitation.    Tricuspid Valve: There is trace to mild regurgitation.  Pulmonary artery pressure around 30 mmHg.      Results for orders placed during the hospital encounter of 22    Holter monitor    Interpretation Summary  PT NAME: Octavio Norris  : 1970  AGE: 52 y.o.  GENDER: male  MRN: [id number]  PROCEDURE: Holter monitor - 24 hour    DATE OF PROCEDURE:  2022    INDICATIONS:  24 hour Holter monitor was performed for palpitations    PROCEDURE:    Average heart rate was 71; minimum heart rate was sinus bradycardia at 46 BPM at  "4:16 a.m.; and maximum heart rate was sinus tachycardia at 146 BPM at 11:32 a.m..    Ventricular ectopic activity consisted of 188, which comprises 0.2% of total beats.  156 were single PVCs    Supraventricular ectopic activity consisted of 25, which comprises 0% of total beats.  18 were single PACs    No significant bradyarrhythmias were present.    No evidence of atrial fibrillation or atrial flutter    Patient's diary included symptoms of no major symptoms reported in journal entry.  When the patient was on his treadmill he was in sinus rhythm with a controlled rhythm    Impression  1. Normal 48 hour holter tracing report.  2. Rhythm was sinus throughout monitoring period.  3. There were rare episodes of Supraventricular ectopic activity.  4. There were rare episodes of Ventricular ectopic activity.        Interpreted by: Han Byrnes MD, [facility]      Imaging:  Chest X-Ray:   No Chest XR results available for this patient.    CT-scan of the chest:     No CTA results available for this patient.  Lab Review   Lab Results   Component Value Date    WBC 4.49 07/19/2022    HGB 15.6 07/19/2022    HCT 47.0 07/19/2022    MCV 88 07/19/2022    RDW 13.2 07/19/2022     07/19/2022     BMP:  Lab Results   Component Value Date    SODIUM 141 12/14/2023    K 3.7 12/14/2023     12/14/2023    CO2 32 12/14/2023    BUN 15 12/14/2023    CREATININE 1.33 (H) 12/14/2023    GLUC 113 (H) 12/14/2023    GLUF 109 (H) 11/14/2022    CALCIUM 9.0 12/14/2023    CORRECTEDCA 9.7 11/14/2022    EGFR 64 12/14/2023    MG 1.9 07/19/2022     LFT:  Lab Results   Component Value Date    AST 22 12/14/2023    ALT 19 12/14/2023    ALKPHOS 64 12/14/2023    TP 7.2 12/14/2023    ALB 4.1 12/14/2023      No components found for: \"TSH3\"  Lab Results   Component Value Date    SMT9YSWNKVCI 0.029 (L) 11/14/2022     Lab Results   Component Value Date    HGBA1C 6.0 (H) 12/14/2023     Lipid Profile:   Lab Results   Component Value Date    CHOLESTEROL 197 " "12/14/2023    HDL 43 12/14/2023    LDLCALC 137 (H) 12/14/2023    TRIG 80 12/14/2023     Lab Results   Component Value Date    CHOLESTEROL 197 12/14/2023    CHOLESTEROL 161 11/14/2022     Lab Results   Component Value Date    TROPONINI <0.02 05/15/2017     No results found for: \"NTBNP\"   No results found for this or any previous visit (from the past 672 hour(s)).          Dr. Jane Cannon MD, FACC      \"This note has been constructed using a voice recognition system.Therefore there may be syntax, spelling, and/or grammatical errors. Please call if you have any questions. \"  "

## 2024-03-01 ENCOUNTER — OFFICE VISIT (OUTPATIENT)
Dept: CARDIOLOGY CLINIC | Facility: CLINIC | Age: 54
End: 2024-03-01
Payer: COMMERCIAL

## 2024-03-01 VITALS
HEIGHT: 71 IN | DIASTOLIC BLOOD PRESSURE: 80 MMHG | WEIGHT: 242 LBS | BODY MASS INDEX: 33.88 KG/M2 | HEART RATE: 66 BPM | SYSTOLIC BLOOD PRESSURE: 124 MMHG | OXYGEN SATURATION: 99 %

## 2024-03-01 DIAGNOSIS — I10 ESSENTIAL HYPERTENSION: Primary | ICD-10-CM

## 2024-03-01 DIAGNOSIS — N40.1 BENIGN PROSTATIC HYPERPLASIA WITH NOCTURIA: ICD-10-CM

## 2024-03-01 DIAGNOSIS — R93.1 ABNORMAL ECHOCARDIOGRAM: ICD-10-CM

## 2024-03-01 DIAGNOSIS — R35.1 BENIGN PROSTATIC HYPERPLASIA WITH NOCTURIA: ICD-10-CM

## 2024-03-01 DIAGNOSIS — E78.2 MIXED HYPERLIPIDEMIA: ICD-10-CM

## 2024-03-01 PROCEDURE — 93000 ELECTROCARDIOGRAM COMPLETE: CPT | Performed by: INTERNAL MEDICINE

## 2024-03-01 PROCEDURE — 99214 OFFICE O/P EST MOD 30 MIN: CPT | Performed by: INTERNAL MEDICINE

## 2024-04-25 ENCOUNTER — OFFICE VISIT (OUTPATIENT)
Dept: URGENT CARE | Facility: CLINIC | Age: 54
End: 2024-04-25
Payer: COMMERCIAL

## 2024-04-25 VITALS
OXYGEN SATURATION: 99 % | DIASTOLIC BLOOD PRESSURE: 70 MMHG | WEIGHT: 248.4 LBS | SYSTOLIC BLOOD PRESSURE: 140 MMHG | TEMPERATURE: 98.1 F | RESPIRATION RATE: 12 BRPM | HEART RATE: 70 BPM | BODY MASS INDEX: 34.64 KG/M2

## 2024-04-25 DIAGNOSIS — J45.21 MILD INTERMITTENT ASTHMA WITH ACUTE EXACERBATION: ICD-10-CM

## 2024-04-25 DIAGNOSIS — J30.2 SEASONAL ALLERGIC RHINITIS, UNSPECIFIED TRIGGER: Primary | ICD-10-CM

## 2024-04-25 PROCEDURE — 99213 OFFICE O/P EST LOW 20 MIN: CPT | Performed by: NURSE PRACTITIONER

## 2024-04-25 RX ORDER — BENZONATATE 200 MG/1
200 CAPSULE ORAL 3 TIMES DAILY PRN
Qty: 20 CAPSULE | Refills: 0 | Status: SHIPPED | OUTPATIENT
Start: 2024-04-25

## 2024-04-25 RX ORDER — PREDNISONE 10 MG/1
TABLET ORAL
Qty: 30 TABLET | Refills: 0 | Status: SHIPPED | OUTPATIENT
Start: 2024-04-25

## 2024-04-25 NOTE — PROGRESS NOTES
[facility]        NAME: Octavio Norris is a 53 y.o. male  : 1970    MRN: [id number]  DATE: 2024  TIME: 10:18 AM    Assessment and Plan   Seasonal allergic rhinitis, unspecified trigger [J30.2]  1. Seasonal allergic rhinitis, unspecified trigger  predniSONE 10 mg tablet      2. Mild intermittent asthma with acute exacerbation  predniSONE 10 mg tablet    benzonatate (TESSALON) 200 MG capsule            Patient Instructions     --Take prednisone as prescribed  --Continue inhalers  --Continue Zyrtec, Flonase  --Mucinex, Rx cough suppressant  --OTC decongestant  --Seek re-evaluation if no improvement/worsening over the next 3-5 days.     If tests have been performed at [facility], our office will contact you with results if changes need to be made to the care plan discussed with you at the visit.  You can review your full results on [facility].    Chief Complaint     Chief Complaint   Patient presents with    Cough     Reports productive cough with yellow mucus, nasal congestion, wheezing at night x 4 days. Also reports a sore throat which lasted a few hours and resolved. Using robitussin which has been helpful. Also used Mucinex Cold and Flu, and Zyrtec for symptoms which have been ineffective. Last dose late yesterday evening.          History of Present Illness       Here with complaints of flare of allergies and asthma over the past couple days.  Gets same thing every spring.    Associated nasal congestion, rhinorrhea, sneezing, mildly productive cough, chest tightness.  Some itching of eyes at times.   No fever, headache, body aches, sore throat, GI complaints.   No ear pain.    Taking Zyrtec, Robitussin, Flonase.  Using inhaler.          Review of Systems   Review of Systems   Constitutional:  Negative for fever.   HENT:  Positive for rhinorrhea. Negative for ear pain and sore throat.    Respiratory:  Positive for cough, chest tightness and wheezing.    Gastrointestinal:  Negative  for abdominal pain.   Musculoskeletal:  Negative for myalgias.   Neurological:  Negative for headaches.         Current Medications       Current Outpatient Medications:     albuterol (Proventil HFA) 90 mcg/act inhaler, Inhale 2 puffs every 6 (six) hours as needed for wheezing, Disp: 18 g, Rfl: 0    benzonatate (TESSALON) 200 MG capsule, Take 1 capsule (200 mg total) by mouth 3 (three) times a day as needed for cough, Disp: 20 capsule, Rfl: 0    Cholecalciferol (VITAMIN D) 125 MCG (5000 UT) CAPS, Take by mouth, Disp: , Rfl:     colchicine (COLCRYS) 0.6 mg tablet, Take 1 tablet (0.6 mg total) by mouth daily, Disp: 5 tablet, Rfl: 1    fexofenadine (ALLEGRA) 180 MG tablet, Take 180 mg by mouth daily, Disp: , Rfl:     fluticasone (FLONASE) 50 mcg/act nasal spray, 2 sprays into each nostril 2 (two) times a day, Disp: 1 Bottle, Rfl: 5    hydrochlorothiazide (HYDRODIURIL) 12.5 mg tablet, Take 1 tablet (12.5 mg total) by mouth daily, Disp: 90 tablet, Rfl: 1    levothyroxine 125 mcg tablet, TAKE 1 TABLET BY MOUTH EVERY DAY, Disp: 90 tablet, Rfl: 1    Multiple Vitamin (MULTIVITAMIN) capsule, Take 1 capsule by mouth daily, Disp: , Rfl:     oxybutynin (DITROPAN) 5 mg tablet, TAKE 1 TABLET BY MOUTH AT BEDTIME, Disp: 90 tablet, Rfl: 0    predniSONE 10 mg tablet, TAKE 5 TAB DAILY X 2 DAYS, THEN 4 TAB DAILY X 2 DAYS, THEN 3 TAB DAILY X 2 DAYS, THEN 2 TAB DAILY X 2 DAYS, THEN 1 TAB DAILY X 2 DAYS, Disp: 30 tablet, Rfl: 0    tamsulosin (FLOMAX) 0.4 mg, Take 1 capsule (0.4 mg total) by mouth daily, Disp: 90 capsule, Rfl: 1    Azelastine HCl 137 MCG/SPRAY SOLN, 1 spray into each nostril 2 (two) times a day (Patient not taking: Reported on 4/25/2024), Disp: 30 mL, Rfl: 1    Current Allergies     Allergies as of 04/25/2024 - Reviewed 04/25/2024   Allergen Reaction Noted    Other Other (See Comments) 01/18/2021            The following portions of the patient's history were reviewed and updated as appropriate: allergies, current  medications, past family history, past medical history, past social history, past surgical history and problem list.     Past Medical History:   Diagnosis Date    Allergic rhinitis     Anemia     Asthma 12/14/2021    Disease of thyroid gland     Hypothyroidism     Nasal congestion        Past Surgical History:   Procedure Laterality Date    NO PAST SURGERIES         Family History   Problem Relation Age of Onset    Breast cancer Mother     Diabetes Father     Coronary artery disease Neg Hx     Colon cancer Neg Hx     Testicular cancer Neg Hx          Medications have been verified.        Objective   /70   Pulse 70   Temp 98.1 °F (36.7 °C) (Temporal)   Resp 12   Wt 113 kg (248 lb 6.4 oz)   SpO2 99%   BMI 34.64 kg/m²   No LMP for male patient.       Physical Exam     Physical Exam  Constitutional:       General: He is not in acute distress.     Appearance: He is well-developed. He is not diaphoretic.   HENT:      Head: Normocephalic and atraumatic.      Right Ear: Tympanic membrane, ear canal and external ear normal.      Left Ear: Tympanic membrane, ear canal and external ear normal.      Nose: Congestion and rhinorrhea present.      Comments: No sinus tenderness.       Mouth/Throat:      Pharynx: No oropharyngeal exudate or posterior oropharyngeal erythema.   Cardiovascular:      Rate and Rhythm: Normal rate and regular rhythm.      Heart sounds: Normal heart sounds.   Pulmonary:      Effort: Pulmonary effort is normal. No respiratory distress.      Breath sounds: Normal breath sounds. No stridor. No wheezing, rhonchi or rales.   Chest:      Chest wall: No tenderness.   Musculoskeletal:      Cervical back: Neck supple. No tenderness.   Lymphadenopathy:      Cervical: No cervical adenopathy.   Skin:     General: Skin is warm and dry.      Findings: No rash.   Neurological:      Mental Status: He is alert and oriented to person, place, and time.   Psychiatric:         Mood and Affect: Mood normal.

## 2024-04-25 NOTE — PATIENT INSTRUCTIONS
--Take prednisone as prescribed  --Continue inhalers  --Continue Zyrtec, Flonase  --Mucinex, Rx cough suppressant  --OTC decongestant  --Seek re-evaluation if no improvement/worsening over the next 3-5 days.

## 2024-04-29 DIAGNOSIS — R35.0 URINARY FREQUENCY: ICD-10-CM

## 2024-04-29 DIAGNOSIS — R35.1 NOCTURIA: ICD-10-CM

## 2024-04-29 DIAGNOSIS — I10 ESSENTIAL HYPERTENSION: ICD-10-CM

## 2024-04-30 RX ORDER — OXYBUTYNIN CHLORIDE 5 MG/1
5 TABLET ORAL
Qty: 90 TABLET | Refills: 1 | Status: SHIPPED | OUTPATIENT
Start: 2024-04-30

## 2024-04-30 RX ORDER — HYDROCHLOROTHIAZIDE 12.5 MG/1
12.5 TABLET ORAL DAILY
Qty: 90 TABLET | Refills: 1 | Status: SHIPPED | OUTPATIENT
Start: 2024-04-30

## 2024-06-12 DIAGNOSIS — R35.0 URINARY FREQUENCY: ICD-10-CM

## 2024-06-12 DIAGNOSIS — R35.1 NOCTURIA: ICD-10-CM

## 2024-06-12 RX ORDER — TAMSULOSIN HYDROCHLORIDE 0.4 MG/1
0.4 CAPSULE ORAL DAILY
Qty: 90 CAPSULE | Refills: 1 | Status: SHIPPED | OUTPATIENT
Start: 2024-06-12

## 2024-07-13 LAB
ALBUMIN SERPL-MCNC: 4.1 G/DL (ref 3.6–5.1)
ALBUMIN/GLOB SERPL: 1.3 (CALC) (ref 1–2.5)
ALP SERPL-CCNC: 69 U/L (ref 35–144)
ALT SERPL-CCNC: 18 U/L (ref 9–46)
AST SERPL-CCNC: 20 U/L (ref 10–35)
BASOPHILS # BLD AUTO: 70 CELLS/UL (ref 0–200)
BASOPHILS NFR BLD AUTO: 1.9 %
BILIRUB SERPL-MCNC: 1.2 MG/DL (ref 0.2–1.2)
BUN SERPL-MCNC: 15 MG/DL (ref 7–25)
BUN/CREAT SERPL: ABNORMAL (CALC) (ref 6–22)
CALCIUM SERPL-MCNC: 9 MG/DL (ref 8.6–10.3)
CHLORIDE SERPL-SCNC: 102 MMOL/L (ref 98–110)
CHOLEST SERPL-MCNC: 190 MG/DL
CHOLEST/HDLC SERPL: 4.3 (CALC)
CO2 SERPL-SCNC: 31 MMOL/L (ref 20–32)
CREAT SERPL-MCNC: 1.26 MG/DL (ref 0.7–1.3)
EOSINOPHIL # BLD AUTO: 152 CELLS/UL (ref 15–500)
EOSINOPHIL NFR BLD AUTO: 4.1 %
ERYTHROCYTE [DISTWIDTH] IN BLOOD BY AUTOMATED COUNT: 13.4 % (ref 11–15)
GFR/BSA.PRED SERPLBLD CYS-BASED-ARV: 68 ML/MIN/1.73M2
GLOBULIN SER CALC-MCNC: 3.1 G/DL (CALC) (ref 1.9–3.7)
GLUCOSE SERPL-MCNC: 111 MG/DL (ref 65–99)
HBA1C MFR BLD: 6.3 % OF TOTAL HGB
HCT VFR BLD AUTO: 44.8 % (ref 38.5–50)
HDLC SERPL-MCNC: 44 MG/DL
HGB BLD-MCNC: 15 G/DL (ref 13.2–17.1)
LDLC SERPL CALC-MCNC: 128 MG/DL (CALC)
LYMPHOCYTES # BLD AUTO: 1447 CELLS/UL (ref 850–3900)
LYMPHOCYTES NFR BLD AUTO: 39.1 %
MCH RBC QN AUTO: 29.4 PG (ref 27–33)
MCHC RBC AUTO-ENTMCNC: 33.5 G/DL (ref 32–36)
MCV RBC AUTO: 87.7 FL (ref 80–100)
MONOCYTES # BLD AUTO: 485 CELLS/UL (ref 200–950)
MONOCYTES NFR BLD AUTO: 13.1 %
NEUTROPHILS # BLD AUTO: 1547 CELLS/UL (ref 1500–7800)
NEUTROPHILS NFR BLD AUTO: 41.8 %
NONHDLC SERPL-MCNC: 146 MG/DL (CALC)
PLATELET # BLD AUTO: 211 THOUSAND/UL (ref 140–400)
PMV BLD REES-ECKER: 10.2 FL (ref 7.5–12.5)
POTASSIUM SERPL-SCNC: 3.7 MMOL/L (ref 3.5–5.3)
PROT SERPL-MCNC: 7.2 G/DL (ref 6.1–8.1)
RBC # BLD AUTO: 5.11 MILLION/UL (ref 4.2–5.8)
SODIUM SERPL-SCNC: 139 MMOL/L (ref 135–146)
TRIGL SERPL-MCNC: 85 MG/DL
WBC # BLD AUTO: 3.7 THOUSAND/UL (ref 3.8–10.8)

## 2024-07-16 ENCOUNTER — RA CDI HCC (OUTPATIENT)
Dept: OTHER | Facility: HOSPITAL | Age: 54
End: 2024-07-16

## 2024-07-16 NOTE — PROGRESS NOTES
HCC coding opportunities       Chart reviewed, no opportunity found: CHART REVIEWED, NO OPPORTUNITY FOUND        Patients Insurance        Commercial Insurance: [employer]

## 2024-07-23 ENCOUNTER — OFFICE VISIT (OUTPATIENT)
Dept: FAMILY MEDICINE CLINIC | Facility: CLINIC | Age: 54
End: 2024-07-23
Payer: COMMERCIAL

## 2024-07-23 VITALS
HEIGHT: 71 IN | OXYGEN SATURATION: 97 % | HEART RATE: 77 BPM | BODY MASS INDEX: 35.14 KG/M2 | DIASTOLIC BLOOD PRESSURE: 72 MMHG | WEIGHT: 251 LBS | SYSTOLIC BLOOD PRESSURE: 120 MMHG

## 2024-07-23 DIAGNOSIS — J30.2 SEASONAL ALLERGIES: ICD-10-CM

## 2024-07-23 DIAGNOSIS — R73.03 PREDIABETES: ICD-10-CM

## 2024-07-23 DIAGNOSIS — D70.0 CONGENITAL NEUTROPENIA (HCC): ICD-10-CM

## 2024-07-23 DIAGNOSIS — E78.2 MIXED HYPERLIPIDEMIA: ICD-10-CM

## 2024-07-23 DIAGNOSIS — J34.89 RHINORRHEA: ICD-10-CM

## 2024-07-23 DIAGNOSIS — R09.81 NASAL CONGESTION: ICD-10-CM

## 2024-07-23 DIAGNOSIS — I10 ESSENTIAL HYPERTENSION: ICD-10-CM

## 2024-07-23 DIAGNOSIS — E03.9 ACQUIRED HYPOTHYROIDISM: Primary | ICD-10-CM

## 2024-07-23 DIAGNOSIS — N52.03 COMBINED ARTERIAL INSUFFICIENCY AND CORPORO-VENOUS OCCLUSIVE ERECTILE DYSFUNCTION: ICD-10-CM

## 2024-07-23 PROCEDURE — 99214 OFFICE O/P EST MOD 30 MIN: CPT | Performed by: FAMILY MEDICINE

## 2024-07-23 RX ORDER — SILDENAFIL 50 MG/1
50 TABLET, FILM COATED ORAL DAILY PRN
Qty: 30 TABLET | Refills: 1 | Status: SHIPPED | OUTPATIENT
Start: 2024-07-23

## 2024-07-23 RX ORDER — AZELASTINE HYDROCHLORIDE 137 UG/1
1 SPRAY, METERED NASAL 2 TIMES DAILY
Qty: 30 ML | Refills: 1 | Status: SHIPPED | OUTPATIENT
Start: 2024-07-23

## 2024-07-23 NOTE — LETTER
July 23, 2024    [facility]  [address]  [place] [id number]      Dear to whom it may concern,    I have recently evaluated my patient in the office on 7/23/2024.  I feel that if the patient were to fly it would exacerbate his underlying mental health concerns.  If possible, please avoid having the patient fly.        If you have any questions or concerns, please don't hesitate to call.                    Sincerely,          Bipin Garrison, DO

## 2024-07-25 NOTE — PROGRESS NOTES
Outpatient Note- Follow up      HPI:      Octavio Norris , 54 y.o. male  presents today for follow up of labs.  The patient recently had labs that demonstrated elevated fasting glucose and A1c.  His A1c is up from 6.0 to 6.3.  We reviewed the importance of reducing carbs and had a kelsey conversation about his diet and reviewed his meals and common food being consumed. We identified several foods that will likely help to reduce his sugars if cut out.  Also his cholesterol was elevated as well.  Hopefully with the decrease in sugars and fat conversation these values will decrease as well.  We did discuss that his ASCVD risk is greater than 7.5% and we will consider medication at next review in about 3 months.  We reviewed the rest of his labs which were grossly normal outside of mild decrease to CBC, likely congenital but will follow value to determine if there are any changes.      Lastly, the patient has not had a significant benefit with Viagra.  Offered other options but he would prefer to increase dose and see if it is effective.      Medical History        Past Medical History:   Diagnosis Date    Allergic rhinitis      Anemia      Asthma 12/14/2021    Disease of thyroid gland      Hypothyroidism      Nasal congestion           ROS:   Review of Systems   Patient denies any fever, chills, nausea, vomiting, lightheadedness, dizziness, chest pain, shortness of breath.     OBJECTIVE      Vitals:     07/23/24 1639   BP: 120/72   Pulse: 77   SpO2: 97%         Physical Exam   No PE Performed        ASSESSMENT AND PLAN   Octavio was seen today for follow-up.  Diagnoses and all orders for this visit:     Acquired hypothyroidism  History of hypothyroidism patient requires follow up labs to ensure proper dosing. Will continue 125mg daily until new labs have been reviewed.   -     TSH, 3rd generation with Free T4 reflex; Future     Prediabetes  Patient has history of prediabetes and last A1c obtain on 7/12/2024 was 6.3.  We  reviewed the importance of carb reduction and monitoring of sugars.  He agrees to do so over the next 3 months.  If not improved or if it increases patient will be in diabetic range and will need to consider medication management.   -     Hemoglobin A1C; Future  -     Lipid panel; Future     Essential hypertension  Stable.   patient to continue with current regimen of 12.5 mg of hydrochlorothiazide daily.     Mixed hyperlipidemia  Reviewed most recent lipid panel.  LDL and non HDL elevated.  This could indicate underlying genetic component.  Will review in 3 months and follow up consideration for medication 2/2 to elevated ASCVD risk.   -     Lipid panel    Combined arterial insufficiency and corporo-venous occlusive erectile dysfunction  25 mg of viagra was unsuccessful.  Therefore recommended increasing dosage to 50 mg.  If still not effective he is to incrementally increase dose by 25 mg until a maximum of 100 mg.  If he is not getting any improvement with higher doses can consider switching to cialis  -     sildenafil (VIAGRA) 50 MG tablet; Take 1 tablet (50 mg total) by mouth daily as needed for erectile dysfunction     Congenital neutropenia (HCC)  Unknown current cause of decreased white count.  Possibly suppressed 2/2 to virus/ recent upper respiratory symptoms.  Will review in 3 months.  Possibly congenital neutropenia.  Patient's ANC within normal limits, although borderline.   -     CBC and differential; Future  -     CBC and differential     Seasonal allergies  Nasal congestion  Rhinorrhea  Patient currently having exacerbation of allergies.  Will recommend restarting allegra and astelin.  Refill of astelin sent to pharmacy.   -     Azelastine HCl 137 MCG/SPRAY SOLN; 1 spray into each nostril 2 (two) times a day                 DO [facility]  7/24/2024 10:05 AM

## 2024-08-25 DIAGNOSIS — E03.9 ACQUIRED HYPOTHYROIDISM: ICD-10-CM

## 2024-08-26 RX ORDER — LEVOTHYROXINE SODIUM 125 UG/1
125 TABLET ORAL DAILY
Qty: 90 TABLET | Refills: 1 | Status: SHIPPED | OUTPATIENT
Start: 2024-08-26

## 2024-11-04 ENCOUNTER — TELEPHONE (OUTPATIENT)
Dept: FAMILY MEDICINE CLINIC | Facility: CLINIC | Age: 54
End: 2024-11-04

## 2024-12-17 DIAGNOSIS — I10 ESSENTIAL HYPERTENSION: ICD-10-CM

## 2024-12-18 RX ORDER — HYDROCHLOROTHIAZIDE 12.5 MG/1
12.5 TABLET ORAL DAILY
Qty: 90 TABLET | Refills: 1 | Status: SHIPPED | OUTPATIENT
Start: 2024-12-18

## 2024-12-25 LAB
BASOPHILS # BLD AUTO: 61 CELLS/UL (ref 0–200)
BASOPHILS NFR BLD AUTO: 1.7 %
CHOLEST SERPL-MCNC: 179 MG/DL
CHOLEST/HDLC SERPL: 4.1 (CALC)
EOSINOPHIL # BLD AUTO: 241 CELLS/UL (ref 15–500)
EOSINOPHIL NFR BLD AUTO: 6.7 %
ERYTHROCYTE [DISTWIDTH] IN BLOOD BY AUTOMATED COUNT: 13 % (ref 11–15)
HCT VFR BLD AUTO: 45.6 % (ref 38.5–50)
HDLC SERPL-MCNC: 44 MG/DL
HGB BLD-MCNC: 15 G/DL (ref 13.2–17.1)
LDLC SERPL CALC-MCNC: 120 MG/DL (CALC)
LYMPHOCYTES # BLD AUTO: 1433 CELLS/UL (ref 850–3900)
LYMPHOCYTES NFR BLD AUTO: 39.8 %
MCH RBC QN AUTO: 29.2 PG (ref 27–33)
MCHC RBC AUTO-ENTMCNC: 32.9 G/DL (ref 32–36)
MCV RBC AUTO: 88.7 FL (ref 80–100)
MONOCYTES # BLD AUTO: 421 CELLS/UL (ref 200–950)
MONOCYTES NFR BLD AUTO: 11.7 %
NEUTROPHILS # BLD AUTO: 1444 CELLS/UL (ref 1500–7800)
NEUTROPHILS NFR BLD AUTO: 40.1 %
NONHDLC SERPL-MCNC: 135 MG/DL (CALC)
PLATELET # BLD AUTO: 220 THOUSAND/UL (ref 140–400)
PMV BLD REES-ECKER: 10.2 FL (ref 7.5–12.5)
RBC # BLD AUTO: 5.14 MILLION/UL (ref 4.2–5.8)
TRIGL SERPL-MCNC: 59 MG/DL
TSH SERPL-ACNC: 0.97 MIU/L (ref 0.4–4.5)
WBC # BLD AUTO: 3.6 THOUSAND/UL (ref 3.8–10.8)

## 2024-12-26 ENCOUNTER — RESULTS FOLLOW-UP (OUTPATIENT)
Dept: FAMILY MEDICINE CLINIC | Facility: CLINIC | Age: 54
End: 2024-12-26

## 2024-12-30 ENCOUNTER — OFFICE VISIT (OUTPATIENT)
Dept: FAMILY MEDICINE CLINIC | Facility: CLINIC | Age: 54
End: 2024-12-30
Payer: COMMERCIAL

## 2024-12-30 VITALS
OXYGEN SATURATION: 99 % | BODY MASS INDEX: 35.98 KG/M2 | HEART RATE: 65 BPM | SYSTOLIC BLOOD PRESSURE: 136 MMHG | HEIGHT: 71 IN | RESPIRATION RATE: 16 BRPM | DIASTOLIC BLOOD PRESSURE: 82 MMHG | WEIGHT: 257 LBS

## 2024-12-30 DIAGNOSIS — R22.1 NECK MASS: ICD-10-CM

## 2024-12-30 DIAGNOSIS — D70.9 NEUTROPENIA, UNSPECIFIED TYPE (HCC): ICD-10-CM

## 2024-12-30 DIAGNOSIS — H25.9 AGE-RELATED CATARACT OF BOTH EYES, UNSPECIFIED AGE-RELATED CATARACT TYPE: ICD-10-CM

## 2024-12-30 DIAGNOSIS — R35.1 NOCTURIA: ICD-10-CM

## 2024-12-30 DIAGNOSIS — M10.071 ACUTE IDIOPATHIC GOUT INVOLVING TOE OF RIGHT FOOT: ICD-10-CM

## 2024-12-30 DIAGNOSIS — I10 ESSENTIAL HYPERTENSION: ICD-10-CM

## 2024-12-30 DIAGNOSIS — R93.1 ABNORMAL ECHOCARDIOGRAM: ICD-10-CM

## 2024-12-30 DIAGNOSIS — G57.11 MERALGIA PARESTHETICA OF RIGHT SIDE: ICD-10-CM

## 2024-12-30 DIAGNOSIS — H53.8 BLURRY VISION, BILATERAL: Primary | ICD-10-CM

## 2024-12-30 DIAGNOSIS — E03.9 ACQUIRED HYPOTHYROIDISM: ICD-10-CM

## 2024-12-30 DIAGNOSIS — M54.10 RADICULAR LEG PAIN: ICD-10-CM

## 2024-12-30 DIAGNOSIS — Z97.3 WEARS GLASSES: ICD-10-CM

## 2024-12-30 DIAGNOSIS — E78.2 MIXED HYPERLIPIDEMIA: ICD-10-CM

## 2024-12-30 DIAGNOSIS — I36.1 NONRHEUMATIC TRICUSPID VALVE REGURGITATION: ICD-10-CM

## 2024-12-30 DIAGNOSIS — R79.89 ABNORMAL CBC: ICD-10-CM

## 2024-12-30 DIAGNOSIS — R00.2 PALPITATIONS: ICD-10-CM

## 2024-12-30 DIAGNOSIS — R35.0 URINARY FREQUENCY: ICD-10-CM

## 2024-12-30 DIAGNOSIS — R73.03 PREDIABETES: ICD-10-CM

## 2024-12-30 DIAGNOSIS — D70.0 CONGENITAL NEUTROPENIA (HCC): ICD-10-CM

## 2024-12-30 PROCEDURE — 99396 PREV VISIT EST AGE 40-64: CPT | Performed by: FAMILY MEDICINE

## 2024-12-30 RX ORDER — COLCHICINE 0.6 MG/1
0.6 TABLET ORAL DAILY
Qty: 15 TABLET | Refills: 0 | Status: SHIPPED | OUTPATIENT
Start: 2024-12-30

## 2024-12-30 NOTE — ASSESSMENT & PLAN NOTE
Borderline.  Patient is to monitor blood pressures at home if possible, if not we will follow-up with him in 3 months.  If it is still abnormal at that time, I recommend either adding medication to hydrochlorothiazide or increasing from 12.5 mg to 25 mg.

## 2024-12-30 NOTE — ASSESSMENT & PLAN NOTE
TSH within normal limits.  Will have him continue with current dosing of Synthroid 125 mcg daily.

## 2024-12-30 NOTE — ASSESSMENT & PLAN NOTE
Patient is not on any current medication management.  He has a reduction in cholesterol, triglycerides, non-HDL, and LDL.  His HDL has remained the same.  Despite a reduction in non-HDL/LDL, he still has elevated values.  ASCVD risk is 11.3.  Patient is interested in a more natural form such as red rice yeast to see if this further reduces his values.  If not, may consider starting Zetia  Orders:    Lipid panel; Future

## 2024-12-30 NOTE — PROGRESS NOTES
Adult Annual Physical  Name: Octavio Norris      : 1970      MRN: [id number]  Encounter Provider: Bipin Garrison DO  Encounter Date: 2024   Encounter department: [facility]    Assessment & Plan  Acute idiopathic gout involving toe of right foot  Patient has had an increase in number of gouty flares.  He does admit to consuming increased red meat and shellfish around the time of flares.  He can prophylactically take the colchicine if he is to consume higher amounts of this type of food.  Otherwise he can start treating his symptoms as soon as they start.  We reviewed the characteristics of the gout including the fact that it may affect multiple joints and typically it will cause increased swelling, redness, tenderness, and heat.  Patient to call if he has increased frequency of symptoms, may need to be placed on a prophylactic such as allopurinol.  Orders:    colchicine (COLCRYS) 0.6 mg tablet; Take 1 tablet (0.6 mg total) by mouth daily    Blurry vision, bilateral  Patient has to wear glasses at night due to blurry vision.  Patient has evidence of bilateral cataracts on physical exam.  Due to the requirement for glasses, difficulty with night driving, and recent examination I would recommend that he follow-up with ophthalmology to determine if his cataracts are mature enough for removal or if he needs to wait.  Patient also has injection of bilateral sclera, he should also rule out any other intraocular pathology.  Orders:    Ambulatory Referral to Ophthalmology; Future    Wears glasses  See blurry vision  Orders:    Ambulatory Referral to Ophthalmology; Future    Age-related cataract of both eyes, unspecified age-related cataract type  See blurry vision  Orders:    Ambulatory Referral to Ophthalmology; Future    Mixed hyperlipidemia  Patient is not on any current medication management.  He has a reduction in cholesterol, triglycerides, non-HDL, and LDL.  His HDL has  remained the same.  Despite a reduction in non-HDL/LDL, he still has elevated values.  ASCVD risk is 11.3.  Patient is interested in a more natural form such as red rice yeast to see if this further reduces his values.  If not, may consider starting Zetia  Orders:    Lipid panel; Future    Congenital neutropenia (HCC)  Patient has a history of reduction in white cell count.  His ANC has continued to reduce as well.  His most recent CBC demonstrates a white count of 3.6 and a ANC less than 1500.  Due to this I recommended that the patient have a E consult with hematology to further evaluate whether or not intervention is required.  Patient is open to this and understands that a co-pay may be sent to his house due to the specialist evaluation.  Orders:    AMB E-CONSULT TO HEMATOLOGY    CBC and differential; Future    Neutropenia, unspecified type (HCC)  See congenital neutropenia  Orders:    AMB E-CONSULT TO HEMATOLOGY    CBC and differential; Future    Abnormal CBC    Orders:    AMB E-CONSULT TO HEMATOLOGY    CBC and differential; Future    Acquired hypothyroidism  TSH within normal limits.  Will have him continue with current dosing of Synthroid 125 mcg daily.       Essential hypertension  Borderline.  Patient is to monitor blood pressures at home if possible, if not we will follow-up with him in 3 months.  If it is still abnormal at that time, I recommend either adding medication to hydrochlorothiazide or increasing from 12.5 mg to 25 mg.       Urinary frequency  Patient has history of urinary frequency.  He is currently on both Flomax and oxybutynin.  This combination seems to be controlling his symptoms.  He does still have some mild increase to his urinary frequency overnight, but not as bad as it was previously.  Orders:    PSA, Total Screen; Future    Nocturia  See urinary frequency  Orders:    PSA, Total Screen; Future    Prediabetes  Patient has a history of prediabetes, last A1c in July 2024 was 6.3.  I  recommend that the patient follow-up with an A1c in 3 months.  Orders:    Hemoglobin A1C; Future    Meralgia paresthetica of right side  Patient states that he has been having increased pain and discomfort on the anterior portion of his thigh with radiation to the lateral aspect as well.  It is possible that this is radicular in nature with the nerve being compressed at the level of the spine.  It is also possible that the lateral femoral cutaneous nerve is also being impinged due to weight as well as clothing.  He is to wear looser fitting clothing to see if this changes any of his symptoms.  Orders:    XR spine lumbar minimum 4 views non injury; Future    Radicular leg pain  See meralgia paresthetica  Orders:    XR spine lumbar minimum 4 views non injury; Future    Neck mass  Patient has had a neck mass that has been present for multiple years.  I do not feel that this is malignant, but it should be evaluated.  Ultrasound should determine whether it is solid or cystic in nature.  Depending on findings, will recommend follow-up removal or further evaluation if needed.  Orders:    US head neck soft tissue; Future    Palpitations  Patient has a history of palpitations and tricuspid valve regurgitation.  Is been over 2 years since his last echo.  Will place orders to follow-up echocardiogram to determine if there is any new change to tricuspid valve.  Order placed for echocardiogram.  Orders:    Echo complete w/ contrast if indicated; Future    Abnormal echocardiogram  See palpitations  Orders:    Echo complete w/ contrast if indicated; Future    Nonrheumatic tricuspid valve regurgitation  See palpitations  Orders:    Echo complete w/ contrast if indicated; Future    Immunizations and preventive care screenings were discussed with patient today. Appropriate education was printed on patient's after visit summary.    Discussed risks and benefits of prostate cancer screening. We discussed the controversial history of PSA  screening for prostate cancer in the [place] as well as the risk of over detection and over treatment of prostate cancer by way of PSA screening.  The patient understands that PSA blood testing is an imperfect way to screen for prostate cancer and that elevated PSA levels in the blood may also be caused by infection, inflammation, prostatic trauma or manipulation, urological procedures, or by benign prostatic enlargement.    The role of the digital rectal examination in prostate cancer screening was also discussed and I discussed with him that there is large interobserver variability in the findings of digital rectal examination.    Counseling:  Alcohol/drug use: discussed moderation in alcohol intake, the recommendations for healthy alcohol use, and avoidance of illicit drug use.  Dental Health: discussed importance of regular tooth brushing, flossing, and dental visits.  Sexual health: discussed sexually transmitted diseases, partner selection, use of condoms, avoidance of unintended pregnancy, and contraceptive alternatives.  Exercise: the importance of regular exercise/physical activity was discussed. Recommend exercise 3-5 times per week for at least 30 minutes.       Depression Screening and Follow-up Plan: Patient was screened for depression during today's encounter. They screened negative with a PHQ-2 score of 0.        History of Present Illness     Adult Annual Physical:  Patient presents for annual physical. Patient presents today for annual physical.  He does admit to having increased numbness and tingling/altered sensation in the lateral to anterior aspect of the right thigh.  He finds that squatting down and pulling his leg back to stretch improves his symptoms, but they typically come on when he is standing or walking for greater than 15 to 20 minutes.  If he rests this tends to go away.  He does not remember any trauma to the area or any previous exercises that may have exacerbated his  symptoms.    In addition the patient has been having intermittent episodes of gout.  Previously within the last 2 weeks the patient had consumed increased red meat, hot dog, and shellfish.  This then exacerbated some of the pain and discomfort in his left foot.  He previously had gout attack in the right foot treated by podiatry at the foot evaluated with most recent episode, they recommended he take NSAIDs and place ice on the area, this did improve his symptoms..     Diet and Physical Activity:  - Diet/Nutrition: well balanced diet. could get better.  - Exercise: walking. pushups, treadmill    Depression Screening:  - PHQ-2 Score: 0    General Health:  - Sleep: sleeps well. night shift, switched shift to improve symptoms  - Hearing: normal hearing bilateral ears.  - Vision: wears glasses. night with driving  - Dental: regular dental visits.     Health:  - History of STDs: no.   - Urinary symptoms: none and nocturia.     Review of Systems   Constitutional:  Negative for chills, fever and unexpected weight change.   HENT:  Negative for congestion, ear discharge, ear pain, hearing loss, postnasal drip, rhinorrhea, sinus pressure, sinus pain and sore throat.    Eyes:  Positive for visual disturbance (glasses at night driving).   Respiratory:  Negative for cough, chest tightness and shortness of breath.    Cardiovascular:  Negative for chest pain and palpitations.   Gastrointestinal:  Negative for abdominal pain, blood in stool, constipation, diarrhea, nausea and vomiting.   Genitourinary:  Negative for dysuria and frequency.   Skin:  Negative for rash and wound.   Allergic/Immunologic: Positive for environmental allergies.   Neurological:  Negative for dizziness, light-headedness and headaches.   Psychiatric/Behavioral:  Negative for self-injury and suicidal ideas.      Medical History Reviewed by provider this encounter:     .  Current Outpatient Medications on File Prior to Visit   Medication Sig Dispense Refill  "   albuterol (Proventil HFA) 90 mcg/act inhaler Inhale 2 puffs every 6 (six) hours as needed for wheezing 18 g 0    Azelastine HCl 137 MCG/SPRAY SOLN 1 spray into each nostril 2 (two) times a day 30 mL 1    benzonatate (TESSALON) 200 MG capsule Take 1 capsule (200 mg total) by mouth 3 (three) times a day as needed for cough 20 capsule 0    Cholecalciferol (VITAMIN D) 125 MCG (5000 UT) CAPS Take by mouth      colchicine (COLCRYS) 0.6 mg tablet Take 1 tablet (0.6 mg total) by mouth daily 5 tablet 1    fexofenadine (ALLEGRA) 180 MG tablet Take 180 mg by mouth daily      fluticasone (FLONASE) 50 mcg/act nasal spray 2 sprays into each nostril 2 (two) times a day 1 Bottle 5    hydroCHLOROthiazide 12.5 mg tablet TAKE 1 TABLET BY MOUTH EVERY DAY 90 tablet 1    levothyroxine 125 mcg tablet TAKE 1 TABLET BY MOUTH EVERY DAY 90 tablet 1    Multiple Vitamin (MULTIVITAMIN) capsule Take 1 capsule by mouth daily      oxybutynin (DITROPAN) 5 mg tablet TAKE 1 TABLET BY MOUTH EVERYDAY AT BEDTIME 90 tablet 1    sildenafil (VIAGRA) 50 MG tablet Take 1 tablet (50 mg total) by mouth daily as needed for erectile dysfunction 30 tablet 1    tamsulosin (FLOMAX) 0.4 mg TAKE 1 CAPSULE BY MOUTH EVERY DAY 90 capsule 1    predniSONE 10 mg tablet TAKE 5 TAB DAILY X 2 DAYS, THEN 4 TAB DAILY X 2 DAYS, THEN 3 TAB DAILY X 2 DAYS, THEN 2 TAB DAILY X 2 DAYS, THEN 1 TAB DAILY X 2 DAYS (Patient not taking: Reported on 12/30/2024) 30 tablet 0     No current facility-administered medications on file prior to visit.      Social History     Tobacco Use    Smoking status: Never    Smokeless tobacco: Never   Vaping Use    Vaping status: Never Used   Substance and Sexual Activity    Alcohol use: Yes     Comment: 1 drink per month    Drug use: No    Sexual activity: Yes     Partners: Female     Birth control/protection: Post-menopausal       Objective   /82   Pulse 65   Resp 16   Ht 5' 11\" (1.803 m)   Wt 117 kg (257 lb)   SpO2 99%   BMI 35.84 kg/m² "     Physical Exam  Constitutional:       General: He is not in acute distress.     Appearance: Normal appearance. He is obese. He is not ill-appearing, toxic-appearing or diaphoretic.   HENT:      Head: Normocephalic and atraumatic.      Right Ear: Tympanic membrane, ear canal and external ear normal. There is no impacted cerumen.      Left Ear: Tympanic membrane, ear canal and external ear normal. There is no impacted cerumen.      Nose: Nose normal. No congestion or rhinorrhea.      Mouth/Throat:      Mouth: Mucous membranes are moist.      Pharynx: Oropharynx is clear. No oropharyngeal exudate or posterior oropharyngeal erythema.   Eyes:      General:         Right eye: No discharge.         Left eye: No discharge.      Extraocular Movements: Extraocular movements intact.      Pupils: Pupils are equal, round, and reactive to light.      Comments: Blurriness of lenses present bilaterally, cataract formation likely   Neck:      Comments: Neck mass/nodular structure under the right mandible.  About an inch in diameter.  Cardiovascular:      Rate and Rhythm: Normal rate and regular rhythm.      Heart sounds: Normal heart sounds. No murmur heard.     No friction rub. No gallop.   Pulmonary:      Effort: Pulmonary effort is normal. No respiratory distress.      Breath sounds: Normal breath sounds. No stridor. No wheezing, rhonchi or rales.   Abdominal:      General: Bowel sounds are normal. There is no distension.      Palpations: Abdomen is soft.      Tenderness: There is no abdominal tenderness.   Skin:     General: Skin is warm.      Capillary Refill: Capillary refill takes less than 2 seconds.   Neurological:      Mental Status: He is alert.      Motor: No weakness (5/5 in all 4 extremities).

## 2024-12-30 NOTE — PATIENT INSTRUCTIONS
Red rice yeast is the supplement that if you consider looking into something for your cholesterol.

## 2024-12-31 ENCOUNTER — E-CONSULT (OUTPATIENT)
Dept: HEMATOLOGY ONCOLOGY | Facility: MEDICAL CENTER | Age: 54
End: 2024-12-31
Payer: COMMERCIAL

## 2024-12-31 DIAGNOSIS — D72.818 OTHER DECREASED WHITE BLOOD CELL (WBC) COUNT: Primary | ICD-10-CM

## 2024-12-31 PROCEDURE — 99447 NTRPROF PH1/NTRNET/EHR 11-20: CPT | Performed by: PHYSICIAN ASSISTANT

## 2024-12-31 NOTE — PROGRESS NOTES
E-Consult  Octavio Norris 54 y.o. male MRN: [id number]  Encounter Date: 12/31/24      Reason for Consult / Principal Problem:     Patient has a reduction in his white cell count.  Possibly congenital neutropenia, but due to a decrease in his white count and ANC prefer to have additional information on testing and or follow-up with specialist       Consulting Provider: Claudia Rajan PA-C    Requesting Provider: Bipin Garrison, *       ASSESSMENT:  Patient is a 54-year-old -American male with history of hyperlipidemia, hypertension, prediabetes, hypothyroidism.    He had lab work drawn on 7/12/2024.  At that time WBC count was 3.7, normal differential, hemoglobin 15.0, platelets 211,000.    Lab work was repeated again on 12/24/2024.  WBC count was 3.6, normal differential, hemoglobin 15.0, platelets 220,000.    RECOMMENDATIONS:  Patient is a 54-year-old with mild leukopenia.      Leukopenia can be secondary to medications, nutritional deficiency, certain medical issues, rarely blood or bone marrow disorders.    Recommend to ensure that the patient has no evidence of B symptoms (night sweats, weight loss, fevers).    Could consider evaluating for nutritional deficiencies (vitamin B12, folate, copper).    Patient has normal hemoglobin, platelets, differential.      If patient feels well, has no B symptoms, has no frequent infections then recommend observation as long as neutropenia remains mild and stable, differential remains normal, and patient has no evidence of anemia or thrombocytopenia.    While we cannot completely rule out a primary bone marrow issue; presently patient needs no additional work-up.    Total time spent 11-20 minutes, >50% of the total time devoted to medical consultative verbal/EMR discussion between providers. Written report will be generated in the EMR..

## 2025-01-16 DIAGNOSIS — M10.071 ACUTE IDIOPATHIC GOUT INVOLVING TOE OF RIGHT FOOT: ICD-10-CM

## 2025-01-16 RX ORDER — COLCHICINE 0.6 MG/1
0.6 TABLET ORAL DAILY
Qty: 90 TABLET | Refills: 1 | OUTPATIENT
Start: 2025-01-16

## 2025-02-06 DIAGNOSIS — R35.1 NOCTURIA: ICD-10-CM

## 2025-02-06 DIAGNOSIS — Z12.5 SCREENING FOR MALIGNANT NEOPLASM OF PROSTATE: ICD-10-CM

## 2025-02-06 DIAGNOSIS — R35.0 URINARY FREQUENCY: ICD-10-CM

## 2025-02-06 DIAGNOSIS — R73.01 ELEVATED FASTING BLOOD SUGAR: ICD-10-CM

## 2025-02-06 DIAGNOSIS — R79.89 ABNORMAL CBC: ICD-10-CM

## 2025-02-06 DIAGNOSIS — E78.2 MIXED HYPERLIPIDEMIA: Primary | ICD-10-CM

## 2025-02-06 DIAGNOSIS — D70.9 NEUTROPENIA, UNSPECIFIED TYPE (HCC): ICD-10-CM

## 2025-02-06 DIAGNOSIS — I10 ESSENTIAL HYPERTENSION: ICD-10-CM

## 2025-02-07 DIAGNOSIS — R35.0 URINARY FREQUENCY: ICD-10-CM

## 2025-02-07 DIAGNOSIS — R35.1 NOCTURIA: ICD-10-CM

## 2025-02-07 RX ORDER — OXYBUTYNIN CHLORIDE 5 MG/1
5 TABLET ORAL
Qty: 90 TABLET | Refills: 1 | Status: SHIPPED | OUTPATIENT
Start: 2025-02-07

## 2025-02-07 RX ORDER — TAMSULOSIN HYDROCHLORIDE 0.4 MG/1
0.4 CAPSULE ORAL DAILY
Qty: 90 CAPSULE | Refills: 1 | Status: SHIPPED | OUTPATIENT
Start: 2025-02-07

## 2025-02-22 DIAGNOSIS — E03.9 ACQUIRED HYPOTHYROIDISM: ICD-10-CM

## 2025-02-23 RX ORDER — LEVOTHYROXINE SODIUM 125 UG/1
125 TABLET ORAL DAILY
Qty: 90 TABLET | Refills: 1 | Status: SHIPPED | OUTPATIENT
Start: 2025-02-23

## 2025-03-03 ENCOUNTER — HOSPITAL ENCOUNTER (OUTPATIENT)
Dept: NON INVASIVE DIAGNOSTICS | Facility: HOSPITAL | Age: 55
Discharge: HOME | End: 2025-03-03
Payer: COMMERCIAL

## 2025-03-03 VITALS
SYSTOLIC BLOOD PRESSURE: 144 MMHG | BODY MASS INDEX: 35.98 KG/M2 | HEART RATE: 60 BPM | HEIGHT: 71 IN | DIASTOLIC BLOOD PRESSURE: 72 MMHG | WEIGHT: 257 LBS

## 2025-03-03 DIAGNOSIS — R00.2 PALPITATIONS: ICD-10-CM

## 2025-03-03 DIAGNOSIS — I36.1 NONRHEUMATIC TRICUSPID VALVE REGURGITATION: ICD-10-CM

## 2025-03-03 DIAGNOSIS — R93.1 ABNORMAL ECHOCARDIOGRAM: ICD-10-CM

## 2025-03-03 PROCEDURE — 93306 TTE W/DOPPLER COMPLETE: CPT | Performed by: INTERNAL MEDICINE

## 2025-03-03 PROCEDURE — 93306 TTE W/DOPPLER COMPLETE: CPT

## 2025-03-04 LAB
AORTIC ROOT: 3.4 CM
AV LVOT PEAK GRADIENT: 4 MMHG
AV PEAK GRADIENT: 5 MMHG
BSA FOR ECHO PROCEDURE: 2.35 M2
DOP CALC LVOT AREA: 2.83 CM2
DOP CALC LVOT DIAMETER: 1.9 CM
E WAVE DECELERATION TIME: 199 MS
E/A RATIO: 1.25
FRACTIONAL SHORTENING: 41 (ref 28–44)
GLOBAL LONGITUIDAL STRAIN: -18 %
INTERVENTRICULAR SEPTUM IN DIASTOLE (PARASTERNAL SHORT AXIS VIEW): 1 CM
INTERVENTRICULAR SEPTUM: 1 CM (ref 0.6–1.1)
LAAS-AP2: 19.5 CM2
LAAS-AP4: 19.8 CM2
LEFT ATRIUM AREA SYSTOLE SINGLE PLANE A4C: 16.1 CM2
LEFT ATRIUM SIZE: 4.2 CM
LEFT ATRIUM VOLUME (MOD BIPLANE): 61 ML
LEFT ATRIUM VOLUME INDEX (MOD BIPLANE): 26 ML/M2
LEFT INTERNAL DIMENSION IN SYSTOLE: 2.7 CM (ref 2.1–4)
LEFT VENTRICULAR INTERNAL DIMENSION IN DIASTOLE: 4.6 CM (ref 3.5–6)
LEFT VENTRICULAR POSTERIOR WALL IN END DIASTOLE: 1.2 CM
LEFT VENTRICULAR STROKE VOLUME: 69 ML
LVSV (TEICH): 69 ML
MV E'TISSUE VEL-LAT: 11 CM/S
MV E'TISSUE VEL-SEP: 8 CM/S
MV PEAK A VEL: 0.56 M/S
MV PEAK E VEL: 70 CM/S
MV STENOSIS PRESSURE HALF TIME: 58 MS
MV VALVE AREA P 1/2 METHOD: 3.79
RA PRESSURE ESTIMATED: 8 MMHG
RIGHT ATRIUM AREA SYSTOLE A4C: 21.8 CM2
RIGHT VENTRICLE ID DIMENSION: 3.3 CM
RV PSP: 30 MMHG
SL CV LEFT ATRIUM LENGTH A2C: 5.2 CM
SL CV LV EF: 60
SL CV PED ECHO LEFT VENTRICLE DIASTOLIC VOLUME (MOD BIPLANE) 2D: 97 ML
SL CV PED ECHO LEFT VENTRICLE SYSTOLIC VOLUME (MOD BIPLANE) 2D: 28 ML
TR MAX PG: 22 MMHG
TR PEAK VELOCITY: 2.3 M/S
TRICUSPID ANNULAR PLANE SYSTOLIC EXCURSION: 2.8 CM
TRICUSPID VALVE PEAK REGURGITATION VELOCITY: 2.34 M/S

## 2025-03-15 ENCOUNTER — RESULTS FOLLOW-UP (OUTPATIENT)
Dept: FAMILY MEDICINE CLINIC | Facility: CLINIC | Age: 55
End: 2025-03-15

## 2025-04-21 ENCOUNTER — OFFICE VISIT (OUTPATIENT)
Dept: URGENT CARE | Facility: CLINIC | Age: 55
End: 2025-04-21
Payer: COMMERCIAL

## 2025-04-21 VITALS
RESPIRATION RATE: 16 BRPM | DIASTOLIC BLOOD PRESSURE: 70 MMHG | OXYGEN SATURATION: 96 % | HEART RATE: 71 BPM | TEMPERATURE: 97.3 F | SYSTOLIC BLOOD PRESSURE: 129 MMHG

## 2025-04-21 DIAGNOSIS — J06.9 ACUTE URI: ICD-10-CM

## 2025-04-21 DIAGNOSIS — J01.00 ACUTE NON-RECURRENT MAXILLARY SINUSITIS: Primary | ICD-10-CM

## 2025-04-21 PROCEDURE — 99213 OFFICE O/P EST LOW 20 MIN: CPT | Performed by: NURSE PRACTITIONER

## 2025-04-21 NOTE — PATIENT INSTRUCTIONS
"Flonase 1 spray each nostril daily.  Robitussin or mucinex as discussed  Saline nasal spray as directed to rinse sinus passages.  Pseudoephedrine 1-2 tablets every 6 hours as needed for congestion.  Increase your fluid intake.  Tylenol and/or Motrin as needed for pain or fever.  Follow up with your PCP for worsening or concerning symptoms    Patient Education     Upper respiratory infection in adults - Discharge instructions   The Basics   Written by the doctors and editors at [facility]   What are discharge instructions? -- Discharge instructions are information about how to take care of yourself after getting medical care for a health problem.  What is an upper respiratory infection? -- An upper respiratory infection (\"URI\") is an illness that can affect your nose, throat, ears, and sinuses. Almost all URIs are caused by a virus. The common cold is an example of a viral URI. Some URIs are caused by bacteria, but this is much less common.  URIs spread easily from person to person, most often through coughing or sneezing. A URI will almost always get better in a week or 2 without any treatment. Because most URIs are caused by viruses, antibiotics do not usually help.  If you do have a bacterial infection, your doctor might prescribe antibiotics.  How do I care for myself at home? -- Ask the doctor or nurse what you should do when you go home. Make sure that you understand exactly what you need to do to care for yourself. Ask questions if there is anything you do not understand.  You should also:   Wash your hands often (figure 1), and cough or sneeze into a tissue. If you do not have a tissue, cough or sneeze into your elbow instead of your hands.   Drink lots of fluids (water, juice, or broth) to stay hydrated, unless your doctor told you otherwise. This will help replace any fluids lost through runny nose or fever. Warm tea or soup can also help soothe a sore throat.   To help a stuffy nose and make it easier to " breathe:   Use saline nose drops or spray.   Use a humidifier if the air in your home feels dry.   Follow the directions on the label carefully if you take over-the-counter cough or cold medicines. Do not take more than 1 medicine that contains acetaminophen. Also, if you have a heart problem or high blood pressure, check with your doctor before you take any of these medicines.   Try to quit smoking if you smoke. Your doctor or nurse can help.  How can I prevent getting another URI? -- The best way to prevent a URI, or keep it from spreading to others, is to keep your hands clean. Wash your hands often with soap and water or alcohol gel rubs.  Some other ways to prevent the spread of infection include:   Always wash your hands with soap and water after you cough, sneeze, or blow your nose.   Clean surfaces and objects that you touch a lot. These include sinks, counters, tables, door handles, remotes, and phones. Use a bleach and water mixture. The germs that cause a URI can live on surfaces for at least 2 hours.   Do not share cups, food, towels, bed linens, or other personal items.   Stay away from other people when you are sick. When you do need to be around other people, consider wearing a face mask.  When should I call the doctor? -- Call for advice if:   You have a persistent fever of 100.4°F (38°C) or higher, chills, a very bad sore throat, or ear or sinus pain.   You get a new fever after several days of feeling the same or getting better.   You start having chest pain when you cough.   You have a cough that lasts more than 10 days.   You cough up blood.   You have any new or worsening symptoms, such as worsening cough or trouble breathing.  All topics are updated as new evidence becomes available and our peer review process is complete.  This topic retrieved from [employer] on: Mar 13, 2024.  Topic 713899 Version 1.0  Release: 32.2.4 - C32.71  © 2024 UpToDate, Inc. and/or its affiliates. All rights  reserved.  figure 1: How to wash your hands     Wet your hands with clean water, and apply a small amount of soap. Lather and rub hands together for at least 20 seconds. Clean your wrists, palms, backs of your hands, between your fingers, tips of your fingers, thumbs, and under and around your nails. Rinse well, and dry your hands using a clean towel.  Graphic 505557 Version 7.0  Consumer Information Use and Disclaimer   Disclaimer: This generalized information is a limited summary of diagnosis, treatment, and/or medication information. It is not meant to be comprehensive and should be used as a tool to help the user understand and/or assess potential diagnostic and treatment options. It does NOT include all information about conditions, treatments, medications, side effects, or risks that may apply to a specific patient. It is not intended to be medical advice or a substitute for the medical advice, diagnosis, or treatment of a health care provider based on the health care provider's examination and assessment of a patient's specific and unique circumstances. Patients must speak with a health care provider for complete information about their health, medical questions, and treatment options, including any risks or benefits regarding use of medications. This information does not endorse any treatments or medications as safe, effective, or approved for treating a specific patient. UpToDate, Inc. and its affiliates disclaim any warranty or liability relating to this information or the use thereof.The use of this information is governed by the Terms of Use, available at https://www.woltersONTRAPORTuwer.com/en/know/clinical-effectiveness-terms. 2024© UpToDate, Inc. and its affiliates and/or licensors. All rights reserved.  Copyright   © 2024 UpToDate, Inc. and/or its affiliates. All rights reserved.

## 2025-04-21 NOTE — PROGRESS NOTES
[facility]        NAME: Octavio Norris is a 54 y.o. male  : 1970    MRN: [id number]  DATE: 2025  TIME: 10:29 AM    Assessment and Plan   Acute non-recurrent maxillary sinusitis [J01.00]  1. Acute non-recurrent maxillary sinusitis  amoxicillin-clavulanate (AUGMENTIN) 875-125 mg per tablet      2. Acute URI              Patient Instructions   Flonase 1 spray each nostril daily.  Robitussin or mucinex as discussed  Saline nasal spray as directed to rinse sinus passages.  Pseudoephedrine 1-2 tablets every 6 hours as needed for congestion.  Increase your fluid intake.  Tylenol and/or Motrin as needed for pain or fever.  Follow up with your PCP for worsening or concerning symptoms    Follow up with PCP in 3-5 days.  Proceed to  ER if symptoms worsen.    Chief Complaint     Chief Complaint   Patient presents with    Cold Like Symptoms     Sx started last week . States he is very congested has cough and hearing noises when he breathes. Taking Robitussin and Mucinex.          History of Present Illness       Patient is a 54-year-old male presenting with 5 days of sinus congestion, and moist, productive cough.  Cough is worse when he is laying down at night preventing him from sleeping.  Denies ear pain or sore throat.  Denies shortness of breath.  He states that he is able to excrete yellow mucus from his lungs and sinuses.  Denies fever or chills.  He is using Mucinex as needed.        Review of Systems   Review of Systems   Constitutional:  Negative for activity change, chills and fever.   HENT:  Positive for congestion and sinus pressure. Negative for ear pain and sore throat.    Respiratory:  Positive for cough and wheezing.    Neurological:  Negative for headaches.         Current Medications       Current Outpatient Medications:     amoxicillin-clavulanate (AUGMENTIN) 875-125 mg per tablet, Take 1 tablet by mouth every 12 (twelve) hours for 7 days, Disp: 14 tablet, Rfl: 0    Cholecalciferol  (VITAMIN D) 125 MCG (5000 UT) CAPS, Take by mouth, Disp: , Rfl:     colchicine (COLCRYS) 0.6 mg tablet, Take 1 tablet (0.6 mg total) by mouth daily, Disp: 15 tablet, Rfl: 0    hydroCHLOROthiazide 12.5 mg tablet, TAKE 1 TABLET BY MOUTH EVERY DAY, Disp: 90 tablet, Rfl: 1    levothyroxine 125 mcg tablet, TAKE 1 TABLET BY MOUTH EVERY DAY, Disp: 90 tablet, Rfl: 1    Multiple Vitamin (MULTIVITAMIN) capsule, Take 1 capsule by mouth daily, Disp: , Rfl:     tamsulosin (FLOMAX) 0.4 mg, TAKE 1 CAPSULE BY MOUTH EVERY DAY, Disp: 90 capsule, Rfl: 1    fexofenadine (ALLEGRA) 180 MG tablet, Take 180 mg by mouth daily (Patient not taking: Reported on 4/21/2025), Disp: , Rfl:     fluticasone (FLONASE) 50 mcg/act nasal spray, 2 sprays into each nostril 2 (two) times a day (Patient not taking: Reported on 4/21/2025), Disp: 1 Bottle, Rfl: 5    oxybutynin (DITROPAN) 5 mg tablet, TAKE 1 TABLET BY MOUTH EVERYDAY AT BEDTIME (Patient not taking: Reported on 4/21/2025), Disp: 90 tablet, Rfl: 1    sildenafil (VIAGRA) 50 MG tablet, Take 1 tablet (50 mg total) by mouth daily as needed for erectile dysfunction (Patient not taking: Reported on 4/21/2025), Disp: 30 tablet, Rfl: 1    Current Allergies     Allergies as of 04/21/2025 - Reviewed 04/21/2025   Allergen Reaction Noted    Other Other (See Comments) 01/18/2021            The following portions of the patient's history were reviewed and updated as appropriate: allergies, current medications, past family history, past medical history, past social history, past surgical history and problem list.     Past Medical History:   Diagnosis Date    Allergic rhinitis     Anemia     Asthma 12/14/2021    Disease of thyroid gland     Hypothyroidism     Nasal congestion        Past Surgical History:   Procedure Laterality Date    NO PAST SURGERIES         Family History   Problem Relation Age of Onset    Breast cancer Mother     Diabetes Father     Coronary artery disease Neg Hx     Colon cancer Neg Hx      Testicular cancer Neg Hx          Medications have been verified.        Objective   /70   Pulse 71   Temp (!) 97.3 °F (36.3 °C) (Temporal)   Resp 16   SpO2 96%        Physical Exam     Physical Exam  Vitals reviewed.   Constitutional:       General: He is awake. He is not in acute distress.     Appearance: Normal appearance.   HENT:      Head: Normocephalic.      Right Ear: Hearing, tympanic membrane, ear canal and external ear normal.      Left Ear: Hearing, tympanic membrane, ear canal and external ear normal.      Nose: Congestion present.      Mouth/Throat:      Lips: Pink.      Pharynx: Oropharynx is clear.   Cardiovascular:      Rate and Rhythm: Normal rate and regular rhythm.      Heart sounds: Normal heart sounds, S1 normal and S2 normal.   Pulmonary:      Effort: Pulmonary effort is normal.      Breath sounds: Wheezing present.      Comments: Expiratory coarse wheezing in all fields.  Skin:     General: Skin is warm and moist.   Neurological:      General: No focal deficit present.      Mental Status: He is alert and oriented to person, place, and time.   Psychiatric:         Behavior: Behavior is cooperative.

## 2025-04-24 ENCOUNTER — TELEPHONE (OUTPATIENT)
Age: 55
End: 2025-04-24

## 2025-04-24 NOTE — TELEPHONE ENCOUNTER
Myrna called to report that he went to care now due a cough and congestion that he have and give him something antibiotic.     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 Garrison for today to request a letter for Today and tomorrow to be off from work     Please advice

## 2025-04-25 ENCOUNTER — RESULTS FOLLOW-UP (OUTPATIENT)
Dept: FAMILY MEDICINE CLINIC | Facility: CLINIC | Age: 55
End: 2025-04-25

## 2025-04-25 ENCOUNTER — OFFICE VISIT (OUTPATIENT)
Dept: FAMILY MEDICINE CLINIC | Facility: CLINIC | Age: 55
End: 2025-04-25

## 2025-04-25 ENCOUNTER — APPOINTMENT (OUTPATIENT)
Dept: RADIOLOGY | Facility: CLINIC | Age: 55
End: 2025-04-25
Payer: COMMERCIAL

## 2025-04-25 VITALS
HEART RATE: 70 BPM | TEMPERATURE: 97.3 F | WEIGHT: 253 LBS | DIASTOLIC BLOOD PRESSURE: 78 MMHG | RESPIRATION RATE: 18 BRPM | BODY MASS INDEX: 35.42 KG/M2 | OXYGEN SATURATION: 95 % | HEIGHT: 71 IN | SYSTOLIC BLOOD PRESSURE: 126 MMHG

## 2025-04-25 DIAGNOSIS — J20.9 ACUTE BRONCHITIS, UNSPECIFIED ORGANISM: Primary | ICD-10-CM

## 2025-04-25 DIAGNOSIS — R05.1 ACUTE COUGH: ICD-10-CM

## 2025-04-25 PROCEDURE — 71046 X-RAY EXAM CHEST 2 VIEWS: CPT

## 2025-04-25 RX ORDER — PREDNISONE 20 MG/1
40 TABLET ORAL DAILY
Qty: 10 TABLET | Refills: 0 | Status: SHIPPED | OUTPATIENT
Start: 2025-04-25 | End: 2025-04-30

## 2025-04-25 RX ORDER — AZITHROMYCIN 250 MG/1
TABLET, FILM COATED ORAL
Qty: 6 TABLET | Refills: 0 | Status: SHIPPED | OUTPATIENT
Start: 2025-04-25 | End: 2025-04-29

## 2025-04-25 RX ORDER — ALBUTEROL SULFATE 0.83 MG/ML
2.5 SOLUTION RESPIRATORY (INHALATION) EVERY 6 HOURS PRN
Qty: 84 ML | Refills: 0 | Status: SHIPPED | OUTPATIENT
Start: 2025-04-25

## 2025-04-25 NOTE — PROGRESS NOTES
Name: Octavio Norris      : 1970      MRN: [id number]  Encounter Provider: BI Roberts  Encounter Date: 2025   Encounter department: [facility] [place]  :  Assessment & Plan  Acute bronchitis, unspecified organism  Will add on Zithromax to cover CAP and prednisone to assist with lung inflammation. Will add on nebulizer treatment as reports minimal symptom improvement with hand held inhaler. Does have machine at home from daughter, will get tubing from pharmacy. Discussed adding on steroid inhaler but prefers oral steroids at this time. Discussed continuing OTC products. Will f/u with PCP on 25 for annual exam. Discussed possibility of allergist referral at that time for injections as requested. Work note provided.   Orders:    azithromycin (ZITHROMAX) 250 mg tablet; Take 2 tablets today then 1 tablet daily x 4 days    predniSONE 20 mg tablet; Take 2 tablets (40 mg total) by mouth daily for 5 days    albuterol (2.5 mg/3 mL) 0.083 % nebulizer solution; Take 3 mL (2.5 mg total) by nebulization every 6 (six) hours as needed for wheezing or shortness of breath    Acute cough  Given ongoing cough, not improved with antibiotics, will check chest x-ray to r/o CAP. Will follow-up with results once received.   Orders:    XR chest pa and lateral; Future           History of Present Illness   54 year old male presents for re-evaluation regarding cough and congestion ongoing x 1.5 weeks. He was seen in urgent care for the same 5 days ago and prescribed Augmentin for a presumed sinusitis. He has been taking as prescribed in addition to taking OTC sudafed and mucinex and using previously prescribed prn albuterol with minimal symptom relief. He denies fevers but reports intermittent SOB with exertion and productive clear/yellow sputum, worse at night when lying down and upon wakening in the AM. He denies any known sick contacts but does have seasonal allergies. He relates he's been on  "prednisone in the past and that has worked well for him.     Cough  This is a recurrent problem. The current episode started 1 to 4 weeks ago. The problem has been unchanged. The problem occurs every few minutes. The cough is Productive of sputum. Associated symptoms include nasal congestion, postnasal drip, rhinorrhea and shortness of breath. Pertinent negatives include no chest pain, chills, ear congestion, ear pain, fever, headaches, heartburn, hemoptysis, myalgias, rash, sore throat, sweats, weight loss or wheezing. The symptoms are aggravated by lying down. He has tried OTC cough suppressant and a beta-agonist inhaler (Augmentin) for the symptoms. The treatment provided no relief.     Review of Systems   Constitutional:  Positive for activity change and fatigue. Negative for appetite change, chills, fever and weight loss.   HENT:  Positive for congestion, postnasal drip, rhinorrhea, sinus pressure and sinus pain. Negative for ear pain, sneezing, sore throat and trouble swallowing.    Eyes:  Negative for pain and visual disturbance.   Respiratory:  Positive for cough and shortness of breath. Negative for hemoptysis, chest tightness and wheezing.    Cardiovascular:  Negative for chest pain and palpitations.   Gastrointestinal:  Negative for abdominal pain, heartburn and vomiting.   Genitourinary:  Negative for dysuria and hematuria.   Musculoskeletal:  Negative for arthralgias, back pain and myalgias.   Skin:  Negative for color change and rash.   Neurological:  Negative for seizures, syncope and headaches.   All other systems reviewed and are negative.      Objective   /78   Pulse 70   Temp (!) 97.3 °F (36.3 °C) (Tympanic)   Resp 18   Ht 5' 11\" (1.803 m)   Wt 115 kg (253 lb)   SpO2 95%   BMI 35.29 kg/m²      Physical Exam  Vitals and nursing note reviewed.   Constitutional:       General: He is not in acute distress.     Appearance: He is well-developed and overweight.   HENT:      Head: " Normocephalic and atraumatic.      Nose: Congestion and rhinorrhea present.      Mouth/Throat:      Mouth: Mucous membranes are moist.   Eyes:      Conjunctiva/sclera: Conjunctivae normal.   Cardiovascular:      Rate and Rhythm: Normal rate and regular rhythm.      Pulses: Normal pulses.      Heart sounds: Normal heart sounds. No murmur heard.  Pulmonary:      Effort: Pulmonary effort is normal. No respiratory distress.      Breath sounds: Rhonchi present. No rales.      Comments: Rhonchi heard throughout all lobes  Chest:      Chest wall: No tenderness.   Musculoskeletal:         General: No swelling.      Cervical back: Neck supple.   Skin:     General: Skin is warm and dry.      Capillary Refill: Capillary refill takes less than 2 seconds.      Findings: Bruising present.   Neurological:      Mental Status: He is alert and oriented to person, place, and time.   Psychiatric:         Mood and Affect: Mood normal.         Behavior: Behavior normal.         Judgment: Judgment normal.

## 2025-04-25 NOTE — PATIENT INSTRUCTIONS
Continue Augmentin as prescribed and start Zithromax/prednisone today. Complete entire course of therapy even if symptoms improve and take medication with food to avoid upset stomach. Use inhalers/nebulizers as indicated as needed. Continue over-the-counter products for cough/congestion (coricidin, robitussin). Follow-up once complete medications if no improvement of symptoms. Report to the ER if symptoms worsen.

## 2025-04-25 NOTE — LETTER
April 25, 2025     Patient: Octavio Norris  YOB: 1970  Date of Visit: 4/25/2025      To Whom it May Concern:    Octavio Norris is under my professional care. Octavio was seen in my office on 4/25/2025. Octavio may return to work on 04/30/2025 .    If you have any questions or concerns, please don't hesitate to call.         Sincerely,          BI Roberts        CC: No Recipients

## 2025-05-02 LAB
BASOPHILS # BLD AUTO: 48 CELLS/UL (ref 0–200)
BASOPHILS NFR BLD AUTO: 0.7 %
CHOLEST SERPL-MCNC: 188 MG/DL
CHOLEST/HDLC SERPL: 4.6 (CALC)
COPPER SERPL-MCNC: 131 MCG/DL (ref 70–175)
EOSINOPHIL # BLD AUTO: 48 CELLS/UL (ref 15–500)
EOSINOPHIL NFR BLD AUTO: 0.7 %
ERYTHROCYTE [DISTWIDTH] IN BLOOD BY AUTOMATED COUNT: 12.9 % (ref 11–15)
FOLATE SERPL-MCNC: 9.6 NG/ML
HBA1C MFR BLD: 6.2 %
HCT VFR BLD AUTO: 45.2 % (ref 38.5–50)
HDLC SERPL-MCNC: 41 MG/DL
HGB BLD-MCNC: 15.1 G/DL (ref 13.2–17.1)
LDLC SERPL CALC-MCNC: 133 MG/DL (CALC)
LYMPHOCYTES # BLD AUTO: 1659 CELLS/UL (ref 850–3900)
LYMPHOCYTES NFR BLD AUTO: 24.4 %
MCH RBC QN AUTO: 29.5 PG (ref 27–33)
MCHC RBC AUTO-ENTMCNC: 33.4 G/DL (ref 32–36)
MCV RBC AUTO: 88.5 FL (ref 80–100)
METHYLMALONATE SERPL-SCNC: 106 NMOL/L (ref 55–335)
MONOCYTES # BLD AUTO: 367 CELLS/UL (ref 200–950)
MONOCYTES NFR BLD AUTO: 5.4 %
NEUTROPHILS # BLD AUTO: 4678 CELLS/UL (ref 1500–7800)
NEUTROPHILS NFR BLD AUTO: 68.8 %
NONHDLC SERPL-MCNC: 147 MG/DL (CALC)
PLATELET # BLD AUTO: 225 THOUSAND/UL (ref 140–400)
PMV BLD REES-ECKER: 10.3 FL (ref 7.5–12.5)
PSA SERPL-MCNC: 1.31 NG/ML
RBC # BLD AUTO: 5.11 MILLION/UL (ref 4.2–5.8)
TRIGL SERPL-MCNC: 51 MG/DL
WBC # BLD AUTO: 6.8 THOUSAND/UL (ref 3.8–10.8)

## 2025-05-04 ENCOUNTER — RESULTS FOLLOW-UP (OUTPATIENT)
Dept: FAMILY MEDICINE CLINIC | Facility: CLINIC | Age: 55
End: 2025-05-04

## 2025-05-05 ENCOUNTER — OFFICE VISIT (OUTPATIENT)
Dept: FAMILY MEDICINE CLINIC | Facility: CLINIC | Age: 55
End: 2025-05-05
Payer: COMMERCIAL

## 2025-05-05 VITALS
HEART RATE: 81 BPM | HEIGHT: 71 IN | DIASTOLIC BLOOD PRESSURE: 82 MMHG | OXYGEN SATURATION: 97 % | BODY MASS INDEX: 35.84 KG/M2 | SYSTOLIC BLOOD PRESSURE: 110 MMHG | WEIGHT: 256 LBS

## 2025-05-05 DIAGNOSIS — I10 ESSENTIAL HYPERTENSION: ICD-10-CM

## 2025-05-05 DIAGNOSIS — Z12.11 SCREENING FOR COLON CANCER: ICD-10-CM

## 2025-05-05 DIAGNOSIS — Z91.89 AT INCREASED RISK FOR CARDIOVASCULAR DISEASE: ICD-10-CM

## 2025-05-05 DIAGNOSIS — Z82.49 FAMILY HISTORY OF HEART DISEASE: Primary | ICD-10-CM

## 2025-05-05 DIAGNOSIS — R73.03 PREDIABETES: ICD-10-CM

## 2025-05-05 DIAGNOSIS — E78.2 MIXED HYPERLIPIDEMIA: ICD-10-CM

## 2025-05-05 PROCEDURE — 99214 OFFICE O/P EST MOD 30 MIN: CPT | Performed by: FAMILY MEDICINE

## 2025-05-05 NOTE — ASSESSMENT & PLAN NOTE
Patient has history of mixed hyperlipidemia.  Is not currently on any medication.  His values have been elevated over the last 5 years, but he is not on any medication.  ASCVD risk is just over the line at 7.9%.  I will recommend that the patient obtain a coronary artery score for further risk stratification.  Patient would like to hold off on medication if possible, but understands the risks if not treating medication.  Orders:    CT coronary calcium score; Future

## 2025-05-05 NOTE — PROGRESS NOTES
Name: Octavio Norris      : 1970      MRN: [id number]  Encounter Provider: Bipin Garrison DO  Encounter Date: 2025   Encounter department: [facility] [place]  :  Assessment & Plan  Mixed hyperlipidemia  Patient has history of mixed hyperlipidemia.  Is not currently on any medication.  His values have been elevated over the last 5 years, but he is not on any medication.  ASCVD risk is just over the line at 7.9%.  I will recommend that the patient obtain a coronary artery score for further risk stratification.  Patient would like to hold off on medication if possible, but understands the risks if not treating medication.  Orders:    CT coronary calcium score; Future    Family history of heart disease  See mixed hyperlipidemia  Orders:    CT coronary calcium score; Future    At increased risk for cardiovascular disease  See mixed hyperlipidemia  Orders:    CT coronary calcium score; Future    Screening for colon cancer  Feels that he is due for a colonoscopy.  He does not have the paperwork with him today, but a GI referral was placed to further follow-up and determine if he is due.  Last was in 2019  Orders:    Ambulatory Referral to Gastroenterology; Future    Essential hypertension  History of elevated blood pressure.  On presentation today BP within normal limits at 110/82.  Patient to continue with current medication regiment Including hydrochlorothiazide 12.5 mg.       Prediabetes  Patient has history of prediabetes.  He currently has values within this range still.  Most recent A1c 6.2 which is down from 6.3.  Patient to continue monitoring his oral intake of carbs and sugars.  He is to return to the office if he is having too much of a issue with weight and oral intake.  May consider GLP-1 versus other oral options such as phentermine, metformin, or Topamax.              History of Present Illness   Patient presents to follow-up lab review and reevaluation of chronic conditions.   "The patient's labs were primarily performed to follow-up for previous low white blood cell count and decreased absolute neutrophils.  The labs that were obtained were recommended by E consult and included folate, B12, copper, follow-up CBC.  Additionally we reviewed the patient's hemoglobin A1c since he has a history of prediabetes.  This was mildly improved from 6.3-6.2 most recently.  Additionally his PSA and cholesterol was obtained.  PSA is within normal range, but there is no other values to compare to.  I did discuss with the patient that velocity is just as important as the number for the lab.    Patient's cholesterol values were also elevated.  Looking back this is over the period of the last 5 years that his non-HDL, LDL, or total cholesterol have been elevated at some time.  We reviewed the ASCVD risk which was around 7.9, and the implications of watching and waiting versus treatment.  We discussed the various ways to evaluate the patient's risk, 1 of which is the coronary artery score.  He would be open to this for further evaluation prior to medication.    We also discussed the importance of the patient's BMI and possible weight loss.  He has been doing his best over the last 6 months or more to decrease his weight and improve his cardiovascular health.  This is through diet and exercise.  The patient is in the gym multiple times a week performing cardio and anabolic workouts.  Unfortunately he has not seen a dramatic improvement in the adipose around his midsection and was interested in discussing other types of medication such as GLP-1 or oral meds.      Review of Systems  Patient denies any fever, chills, nausea, vomiting, lightheadedness, dizziness, chest pain, shortness of breath.    Objective   /82   Pulse 81   Ht 5' 11\" (1.803 m)   Wt 116 kg (256 lb)   SpO2 97%   BMI 35.70 kg/m²      Physical Exam  Constitutional:       General: He is not in acute distress.     Appearance: Normal " appearance. He is not ill-appearing, toxic-appearing or diaphoretic.   HENT:      Head: Normocephalic and atraumatic.      Nose: Nose normal. No congestion or rhinorrhea.      Mouth/Throat:      Mouth: Mucous membranes are moist.      Pharynx: Oropharynx is clear. No oropharyngeal exudate or posterior oropharyngeal erythema.   Cardiovascular:      Rate and Rhythm: Normal rate and regular rhythm.      Heart sounds: Normal heart sounds. No murmur heard.     No friction rub. No gallop.   Pulmonary:      Effort: Pulmonary effort is normal. No respiratory distress.      Breath sounds: Normal breath sounds. No stridor. No wheezing, rhonchi or rales.   Skin:     General: Skin is warm.      Coloration: Skin is not jaundiced or pale.      Findings: No bruising, erythema or lesion.   Neurological:      Mental Status: He is alert.

## 2025-05-06 NOTE — ASSESSMENT & PLAN NOTE
History of elevated blood pressure.  On presentation today BP within normal limits at 110/82.  Patient to continue with current medication regiment Including hydrochlorothiazide 12.5 mg.

## 2025-05-19 ENCOUNTER — HOSPITAL ENCOUNTER (OUTPATIENT)
Dept: CT IMAGING | Facility: HOSPITAL | Age: 55
Discharge: HOME | End: 2025-05-19
Attending: FAMILY MEDICINE
Payer: COMMERCIAL

## 2025-05-19 DIAGNOSIS — Z82.49 FAMILY HISTORY OF HEART DISEASE: ICD-10-CM

## 2025-05-19 DIAGNOSIS — Z91.89 AT INCREASED RISK FOR CARDIOVASCULAR DISEASE: ICD-10-CM

## 2025-05-19 DIAGNOSIS — E78.2 MIXED HYPERLIPIDEMIA: ICD-10-CM

## 2025-05-19 PROCEDURE — 75571 CT HRT W/O DYE W/CA TEST: CPT

## 2025-05-26 ENCOUNTER — RESULTS FOLLOW-UP (OUTPATIENT)
Dept: FAMILY MEDICINE CLINIC | Facility: CLINIC | Age: 55
End: 2025-05-26

## 2025-06-11 DIAGNOSIS — I10 ESSENTIAL HYPERTENSION: ICD-10-CM

## 2025-06-11 RX ORDER — HYDROCHLOROTHIAZIDE 12.5 MG/1
12.5 TABLET ORAL DAILY
Qty: 90 TABLET | Refills: 1 | Status: SHIPPED | OUTPATIENT
Start: 2025-06-11

## 2025-06-17 ENCOUNTER — PREP FOR PROCEDURE (OUTPATIENT)
Age: 55
End: 2025-06-17

## 2025-06-17 ENCOUNTER — TELEPHONE (OUTPATIENT)
Age: 55
End: 2025-06-17

## 2025-06-17 DIAGNOSIS — Z12.11 SPECIAL SCREENING FOR MALIGNANT NEOPLASMS, COLON: Primary | ICD-10-CM

## 2025-06-17 NOTE — TELEPHONE ENCOUNTER
06/17/25  Screened by: Kecia Graham MA    Referring Provider PCP    Pre- Screening:     There is no height or weight on file to calculate BMI.  Has patient been referred for a routine screening Colonoscopy? yes  Is the patient between 45-75 years old? yes      Previous Colonoscopy yes   If yes:    Date: 2019    Facility: [place], [place]    Reason: SCREENING          Does the patient want to see a Gastroenterologist prior to their procedure OR are they having any GI symptoms? yes    Has the patient been hospitalized or had abdominal surgery in the past 6 months? NO    Does the patient use supplemental oxygen? NO    Does the patient take Coumadin, Lovenox, Plavix, Elliquis, Xarelto, or other blood thinning medication? NO    Has the patient had a stroke, cardiac event, or stent placed in the past year? NO        If patient is between 45yrs - 49yrs, please advise patient that we will have to confirm benefits & coverage with their insurance company for a routine screening colonoscopy.

## 2025-06-17 NOTE — TELEPHONE ENCOUNTER
Scheduled date of colonoscopy (as of today): 8/25/2025  Physician performing colonoscopy: DR ANTHONY  Location of colonoscopy: [facility]  Bowel prep reviewed with patient: SHANNEN/LIANET  Instructions reviewed with patient by: Kecia via telephone. Procedure directions sent via [employer].  Clearances: n/a

## 2025-08-15 ENCOUNTER — ANESTHESIA (OUTPATIENT)
Dept: ANESTHESIOLOGY | Facility: HOSPITAL | Age: 55
End: 2025-08-15

## 2025-08-15 ENCOUNTER — ANESTHESIA EVENT (OUTPATIENT)
Dept: ANESTHESIOLOGY | Facility: HOSPITAL | Age: 55
End: 2025-08-15